# Patient Record
Sex: MALE | Race: BLACK OR AFRICAN AMERICAN | NOT HISPANIC OR LATINO | Employment: OTHER | URBAN - METROPOLITAN AREA
[De-identification: names, ages, dates, MRNs, and addresses within clinical notes are randomized per-mention and may not be internally consistent; named-entity substitution may affect disease eponyms.]

---

## 2019-07-23 ENCOUNTER — OFFICE VISIT (OUTPATIENT)
Dept: ORTHOPEDICS | Facility: CLINIC | Age: 69
End: 2019-07-23
Payer: MEDICARE

## 2019-07-23 ENCOUNTER — HOSPITAL ENCOUNTER (OUTPATIENT)
Dept: RADIOLOGY | Facility: HOSPITAL | Age: 69
Discharge: HOME | End: 2019-07-23
Attending: PHYSICIAN ASSISTANT
Payer: MEDICARE

## 2019-07-23 VITALS — WEIGHT: 175.5 LBS | HEIGHT: 72 IN | BODY MASS INDEX: 23.77 KG/M2

## 2019-07-23 DIAGNOSIS — M25.562 LEFT KNEE PAIN, UNSPECIFIED CHRONICITY: ICD-10-CM

## 2019-07-23 DIAGNOSIS — M17.0 PRIMARY OSTEOARTHRITIS OF BOTH KNEES: Primary | ICD-10-CM

## 2019-07-23 PROCEDURE — 73564 X-RAY EXAM KNEE 4 OR MORE: CPT | Mod: 26,LT,, | Performed by: RADIOLOGY

## 2019-07-23 PROCEDURE — 99999 PR PBB SHADOW E&M-EST. PATIENT-LVL III: CPT | Mod: PBBFAC,,, | Performed by: PHYSICIAN ASSISTANT

## 2019-07-23 PROCEDURE — 73564 XR KNEE ORTHO LEFT WITH FLEXION: ICD-10-PCS | Mod: 26,LT,, | Performed by: RADIOLOGY

## 2019-07-23 PROCEDURE — 99213 OFFICE O/P EST LOW 20 MIN: CPT | Mod: PBBFAC,25 | Performed by: PHYSICIAN ASSISTANT

## 2019-07-23 PROCEDURE — 20610 PR DRAIN/INJECT LARGE JOINT/BURSA: ICD-10-PCS | Mod: S$PBB,LT,, | Performed by: PHYSICIAN ASSISTANT

## 2019-07-23 PROCEDURE — 99999 PR PBB SHADOW E&M-EST. PATIENT-LVL III: ICD-10-PCS | Mod: PBBFAC,,, | Performed by: PHYSICIAN ASSISTANT

## 2019-07-23 PROCEDURE — 20610 DRAIN/INJ JOINT/BURSA W/O US: CPT | Mod: S$PBB,LT,, | Performed by: PHYSICIAN ASSISTANT

## 2019-07-23 PROCEDURE — 73562 XR KNEE ORTHO LEFT WITH FLEXION: ICD-10-PCS | Mod: 26,59,RT, | Performed by: RADIOLOGY

## 2019-07-23 PROCEDURE — 99203 PR OFFICE/OUTPT VISIT, NEW, LEVL III, 30-44 MIN: ICD-10-PCS | Mod: 25,S$PBB,, | Performed by: PHYSICIAN ASSISTANT

## 2019-07-23 PROCEDURE — 20610 DRAIN/INJ JOINT/BURSA W/O US: CPT | Mod: PBBFAC | Performed by: PHYSICIAN ASSISTANT

## 2019-07-23 PROCEDURE — 99203 OFFICE O/P NEW LOW 30 MIN: CPT | Mod: 25,S$PBB,, | Performed by: PHYSICIAN ASSISTANT

## 2019-07-23 PROCEDURE — 73562 X-RAY EXAM OF KNEE 3: CPT | Mod: TC,LT

## 2019-07-23 PROCEDURE — 73562 X-RAY EXAM OF KNEE 3: CPT | Mod: 26,59,RT, | Performed by: RADIOLOGY

## 2019-07-23 RX ORDER — TRIAMCINOLONE ACETONIDE 40 MG/ML
60 INJECTION, SUSPENSION INTRA-ARTICULAR; INTRAMUSCULAR
Status: COMPLETED | OUTPATIENT
Start: 2019-07-23 | End: 2019-07-23

## 2019-07-23 RX ADMIN — TRIAMCINOLONE ACETONIDE 60 MG: 40 INJECTION, SUSPENSION INTRA-ARTICULAR; INTRAMUSCULAR at 10:07

## 2019-07-23 NOTE — PROGRESS NOTES
SUBJECTIVE:     Chief Complaint : left knee pain    History of Present Illness:  Logan Massey is a 68 y.o. male seen in clinic today with a chief complaint of left knee pain. Pain started last week when he stood for several hours at a . He did not fall or twist knee. He developed a large effusion later that day and has had pain and difficulty ambulating since that time. Pt has chronic bilateral knee pain, historically right worse than left. He is using a cane, herbal supplements and topical creams. He played football in high school but denies any big injury or any history of knee surgery. No history of gout. He kneels for prayer and also does stephon chi regularly.     Past Medical History:   Diagnosis Date    Prostate cancer        Review of Systems:  Constitutional: no fever or chills  ENT: no nasal congestion or sore throat  Respiratory: no cough or shortness of breath  Cardiovascular: no chest pain or palpitations  Gastrointestinal: no nausea or vomiting, tolerating diet  Genitourinary: no hematuria or dysuria  Integument/Breast: no rash or pruritis  Hematologic/Lymphatic: no easy bruising or lymphadenopathy  Musculoskeletal: see HPI  Neurological: no seizures or tremors  Behavioral/Psych: no auditory or visual hallucinations    OBJECTIVE:     PHYSICAL EXAM:  Height 6' (1.829 m), weight 79.6 kg (175 lb 7.8 oz).   General Appearance: WDWN, NAD  Gait: antalgic, using cane   Neuro/Psych: Mood & affect appropriate  Lungs: Respirations equal and unlabored.   CV: 2+ bilateral upper and lower extremity pulses.   Skin: Intact throughout LE  Extremities: No LE edema    Right Knee Exam  Range of Motion:0-120 active   Effusion:none  Condition of skin:intact  Location of tenderness:Medial joint line   Strength:5 of 5 quadriceps strength and 5 of 5 hamstring strength  Stability:stable to testing    Left Knee Exam  Range of Motion:5-115 active   Effusion:yes  Condition of skin:intact  Location of tenderness:Medial  joint line and Lateral joint line   Strength:5 of 5 quadriceps strength and 5 of 5 hamstring strength  Stability:stable to testing    Alignment: Moderate varus    Left Hip Examination: no pain with PROM     RADIOGRAPHS: AP, lateral and merchant knee x-rays ordered and images reviewed today by me reveal advanced degenerative changes in both knees.     ASSESSMENT/PLAN:   Primary osteoarthritis of both knees  - Left knee effusion  - Arthrocentesis and injection of left knee today  - Discussed activity modification, low impact activity  - Discussed TKA.   - Pt will call for f/u when ready. Will schedule with surgeon when ready to discuss TKA    Knee Arthrocentesis With Injection Procedure Note  Diagnosis: left knee osteoarthritis with effusion  Indications: Knee pain  Procedure Details: Verbal consent obtained and allergies reviewed. Area prepped with alcohol.  An 18 gauge needle was inserted into superolateral aspect of knee. 210 ml of clear yellow was removed from the joint and discarded.  The joint was then injected through the same needle with 1.5 mg of Kenalog and 3 ml 1% lidocaine. The needle was removed and the area was cleansed and dressed.    Complications: None.

## 2019-08-01 ENCOUNTER — TELEPHONE (OUTPATIENT)
Dept: ORTHOPEDICS | Facility: CLINIC | Age: 69
End: 2019-08-01

## 2019-08-01 NOTE — TELEPHONE ENCOUNTER
----- Message from Maya  sent at 8/1/2019 10:58 AM CDT -----  Contact: Self  Pt requesting call back to discuss surgery for a total knee replacement @ [phone]             We spoke  He chose to see Dr [facility] to discuss a total knee replacement

## 2019-08-06 ENCOUNTER — OFFICE VISIT (OUTPATIENT)
Dept: ORTHOPEDICS | Facility: CLINIC | Age: 69
End: 2019-08-06
Payer: MEDICARE

## 2019-08-06 ENCOUNTER — HOSPITAL ENCOUNTER (OUTPATIENT)
Dept: RADIOLOGY | Facility: HOSPITAL | Age: 69
Discharge: HOME | End: 2019-08-06
Attending: ORTHOPAEDIC SURGERY
Payer: MEDICARE

## 2019-08-06 VITALS
HEART RATE: 89 BPM | SYSTOLIC BLOOD PRESSURE: 157 MMHG | DIASTOLIC BLOOD PRESSURE: 97 MMHG | BODY MASS INDEX: 23.74 KG/M2 | HEIGHT: 72 IN | WEIGHT: 175.25 LBS

## 2019-08-06 DIAGNOSIS — M17.9 OSTEOARTHRITIS OF KNEE, UNSPECIFIED (CODE): Primary | ICD-10-CM

## 2019-08-06 DIAGNOSIS — M17.9 OSTEOARTHRITIS OF KNEE, UNSPECIFIED (CODE): ICD-10-CM

## 2019-08-06 PROCEDURE — 73560 X-RAY EXAM OF KNEE 1 OR 2: CPT | Mod: 26,LT,, | Performed by: RADIOLOGY

## 2019-08-06 PROCEDURE — 99214 PR OFFICE/OUTPT VISIT, EST, LEVL IV, 30-39 MIN: ICD-10-PCS | Mod: S$PBB,,, | Performed by: ORTHOPAEDIC SURGERY

## 2019-08-06 PROCEDURE — 99214 OFFICE O/P EST MOD 30 MIN: CPT | Mod: PBBFAC,25 | Performed by: ORTHOPAEDIC SURGERY

## 2019-08-06 PROCEDURE — 99214 OFFICE O/P EST MOD 30 MIN: CPT | Mod: S$PBB,,, | Performed by: ORTHOPAEDIC SURGERY

## 2019-08-06 PROCEDURE — 73560 XR KNEE 1 OR 2 VIEW LEFT: ICD-10-PCS | Mod: 26,LT,, | Performed by: RADIOLOGY

## 2019-08-06 PROCEDURE — 73560 X-RAY EXAM OF KNEE 1 OR 2: CPT | Mod: TC,LT

## 2019-08-06 PROCEDURE — 99999 PR PBB SHADOW E&M-EST. PATIENT-LVL IV: CPT | Mod: PBBFAC,,, | Performed by: ORTHOPAEDIC SURGERY

## 2019-08-06 PROCEDURE — 99999 PR PBB SHADOW E&M-EST. PATIENT-LVL IV: ICD-10-PCS | Mod: PBBFAC,,, | Performed by: ORTHOPAEDIC SURGERY

## 2019-08-06 NOTE — LETTER
August 6, 2019      Shae Moffett PA-C  [address]  [facility] [address]           [facility]oped[facility]  [address]  [facility] [address]  Phone: [phone]          Patient: Logan Massey   MR Number: [id number]   YOB: 1950   Date of Visit: 8/6/2019       Dear Shae Moffett:    Thank you for referring Logan Massey to me for evaluation. Attached you will find relevant portions of my assessment and plan of care.    If you have questions, please do not hesitate to call me. I look forward to following Logan Massey along with you.    Sincerely,    [facility] MD    Enclosure  CC:  No Recipients    If you would like to receive this communication electronically, please contact [email or web address] or [phone] to request more information on [employer] Link access.    For providers and/or their staff who would like to refer a patient to [facility], please contact us through our one-stop-shop provider referral line, [facility], at [phone].    If you feel you have received this communication in error or would no longer like to receive these types of communications, please e-mail [email or web address]

## 2019-08-06 NOTE — PROGRESS NOTES
HPI:    Logan Massey is a 69 y.o. male who is here today for   Chief Complaint   Patient presents with    Left Knee - Pain      Pt has chronic bilateral knee pain, historically right worse than left. However 2 weeks ago he was at a  and was standing for two hours. After this he had severe knee pain and large effusion. He was seen by Shae Moffett in clinic a week ago. 210 cc were aspirated and steroid injected at that time. He reports continued knee pain. H/o prostate CA treated with watchful waiting. He is using a cane, herbal supplements and topical creams. He played football in high school but denies any big injury or any history of knee surgery. No history of gout. He kneels for prayer and also does stephon chi regularly    Duration: > 5 year history of knee pain however more severe over last month  Intensity: moderate  Character of pain: dull  Location: He reports that the pain is predominately  medial  Patient's pain increases with activity.  Pain is increased with weightbearing and interferes with activities of daily living.    He has tried conservative management including NSAIDS, injections, and activity modification without relief.    He has discussed options with his family and wishes to schedule TKA.     PMSFSH reviewed per clinic record       Past Medical History:   Diagnosis Date    Prostate cancer         No current outpatient medications on file.     Review of patient's allergies indicates:  No Known Allergies     ROS  Constitutional: Negative for fever, Negative for weight loss  HENT Negative for congestion  Cardiovascular: Negative chest pain  Respiratory: Negative Shortness of breath  Heme: Negative excessive bleeding  Skin:NegativeItching, Negative breakdown  Musculoskeletal:Negative for back pain, Positive for joint pain, Negative muscle pain, Negative muscle weakness  Neurological: Negative for numbness and paresthesias   Psychiatric/Behavioral: Negative altered mental status, Negative  for depression    Physical Exam:   BP (!) 157/97 (BP Location: Right arm, Patient Position: Sitting, BP Method: Medium (Automatic))   Pulse 89   Ht 6' (1.829 m)   Wt 79.5 kg (175 lb 4.3 oz)   BMI 23.77 kg/m²   General appearance: This is a well-developed, Well nourished male No obvious acute distress.  Psychiatric: normal mood and affect;  pleasant and conversant; judgment and thought content normal  Gait is coordinated. Patient has antalgic gait to the left  Cardiovascular: There are no swelling or varicosities present.   Respiratory: normal respiratory effort   Lymphatic: no adenopathy   Neurologic: alert and oriented to person, place, and time   Deep Tendon Reflexes are normal;  Coordination and Balance: Normal   Musculoskeletal   Neck    ROM shows normal flexion and extension and lateral rotation    Palpation: Non-tender    Stability is normal    Strength is normal    Skin is normal without masses and lesions    Sensation is intact to light touch   Back    ROM showsnormal flexion, extension and     rotation    Palpation shows no masses    Stability is normal    Strength to flexion and extension well maintained    Core strength is diminished    Skin shows no rashes or cafe au lait spots;     Sensation is intact to light touch  Right hip   Range of motion normal    Left Hip  Range of motionnormal    Right Knee  Swelling none  TendernessNone  Range of Motion: 0-110  Motion is painfulNo  Crepitance presentYes    Right Leg  Neurologic Intact  Pulses Intact    Left Knee: Swelling Mild  TendernessNone  Range of Motion: 1-110   Motion is painful No    Left Leg   Neurologic Intact  Pulses Intact    Radiograph: Show severe degenerative arthritis with subchondral sclerosis, periarticular osteophytes and narrowing of joint space.  Angle: significant varus    Physical therapy is contraindicated due to potential bone loss on this severe arthritic joint.    Assessment:  Knee arthritis bilateral      He will need to be  cleared in our PreOp center. Will need to get medical records from [place], [place] where he is from.        He  has a past medical history of Prostate cancer. . We will have to take this into account. He  will be followed by the hospitalist service while in the hospital.       We have gone over the hospitalization and recovery with him as well.  This is typically around 2 weeks on a walker and transition to a cane after that.  He will have home health likely for 3-4 weeks and then transition to outpatient if necessary.  He is agreement with this plan of care and is ready to proceed.  I will see him back for clinical recheck at the 2-week postop michelle.  I will see him back for clinical recheck for any other questions or problems as needed and certainly for any other issues in the interim.    We have discussed risks of total knee replacement which include but are not limited to blood clots in the legs that can travel to the lungs (pulmonary embolism). Pulmonary embolism can cause shortness of breath, chest pain, and even shock. Other risks include urinary tract infection, nausea and vomiting (usually related to pain medication), chronic knee pain and stiffness, bleeding into the knee joint, nerve damage, blood vessel injury, and infection of the knee which can require re-operation. Furthermore, the risks of anesthesia include potential heart, lung, kidney, and liver damage.

## 2019-08-12 ENCOUNTER — TELEPHONE (OUTPATIENT)
Dept: PREADMISSION TESTING | Facility: HOSPITAL | Age: 69
End: 2019-08-12

## 2019-08-12 DIAGNOSIS — Z01.818 PRE-OP TESTING: Primary | ICD-10-CM

## 2019-08-12 DIAGNOSIS — M79.606 PAIN OF LOWER EXTREMITY, UNSPECIFIED LATERALITY: ICD-10-CM

## 2019-08-12 NOTE — TELEPHONE ENCOUNTER
----- Message from Lo Lindsay LPN sent at 8/12/2019  9:51 AM CDT -----  8/26/19-Ortho (knee arthroplasty) Patient needs CBC,BMP,PT/INR,OPOC and POC appt.

## 2019-08-19 DIAGNOSIS — Z01.818 PRE-OP TESTING: Primary | ICD-10-CM

## 2019-08-20 ENCOUNTER — INITIAL CONSULT (OUTPATIENT)
Dept: INTERNAL MEDICINE | Facility: CLINIC | Age: 69
End: 2019-08-20
Payer: MEDICARE

## 2019-08-20 ENCOUNTER — HOSPITAL ENCOUNTER (OUTPATIENT)
Dept: CARDIOLOGY | Facility: CLINIC | Age: 69
Discharge: HOME | End: 2019-08-20
Payer: MEDICARE

## 2019-08-20 ENCOUNTER — OFFICE VISIT (OUTPATIENT)
Dept: ORTHOPEDICS | Facility: CLINIC | Age: 69
End: 2019-08-20
Payer: MEDICARE

## 2019-08-20 ENCOUNTER — HOSPITAL ENCOUNTER (OUTPATIENT)
Dept: PREADMISSION TESTING | Facility: HOSPITAL | Age: 69
Discharge: HOME | End: 2019-08-20
Attending: ANESTHESIOLOGY
Payer: MEDICARE

## 2019-08-20 VITALS
HEIGHT: 72 IN | WEIGHT: 175.31 LBS | BODY MASS INDEX: 23.75 KG/M2 | OXYGEN SATURATION: 99 % | HEART RATE: 70 BPM | DIASTOLIC BLOOD PRESSURE: 90 MMHG | SYSTOLIC BLOOD PRESSURE: 152 MMHG

## 2019-08-20 VITALS
DIASTOLIC BLOOD PRESSURE: 96 MMHG | WEIGHT: 175.25 LBS | BODY MASS INDEX: 23.74 KG/M2 | HEIGHT: 72 IN | SYSTOLIC BLOOD PRESSURE: 156 MMHG

## 2019-08-20 VITALS — SYSTOLIC BLOOD PRESSURE: 152 MMHG | TEMPERATURE: 97 F | DIASTOLIC BLOOD PRESSURE: 85 MMHG

## 2019-08-20 DIAGNOSIS — D55.0: ICD-10-CM

## 2019-08-20 DIAGNOSIS — M17.12 PRIMARY OSTEOARTHRITIS OF LEFT KNEE: Primary | ICD-10-CM

## 2019-08-20 DIAGNOSIS — R17 ELEVATED BILIRUBIN: ICD-10-CM

## 2019-08-20 DIAGNOSIS — E16.2 HYPOGLYCEMIA: ICD-10-CM

## 2019-08-20 DIAGNOSIS — Z01.818 PREOP EXAMINATION: Primary | ICD-10-CM

## 2019-08-20 DIAGNOSIS — R30.0 DYSURIA: ICD-10-CM

## 2019-08-20 DIAGNOSIS — Z85.46 HISTORY OF PROSTATE CANCER: ICD-10-CM

## 2019-08-20 DIAGNOSIS — D75.89 MACROCYTOSIS: ICD-10-CM

## 2019-08-20 DIAGNOSIS — R03.0 ELEVATED BP WITHOUT DIAGNOSIS OF HYPERTENSION: ICD-10-CM

## 2019-08-20 DIAGNOSIS — D72.819 LEUKOPENIA, UNSPECIFIED TYPE: ICD-10-CM

## 2019-08-20 DIAGNOSIS — Z01.818 PRE-OP TESTING: ICD-10-CM

## 2019-08-20 PROCEDURE — 99999 PR PBB SHADOW E&M-EST. PATIENT-LVL III: ICD-10-PCS | Mod: PBBFAC,,, | Performed by: PHYSICIAN ASSISTANT

## 2019-08-20 PROCEDURE — 99211 OFF/OP EST MAY X REQ PHY/QHP: CPT | Mod: PBBFAC,25 | Performed by: HOSPITALIST

## 2019-08-20 PROCEDURE — 93010 EKG 12-LEAD: ICD-10-PCS | Mod: S$PBB,,, | Performed by: INTERNAL MEDICINE

## 2019-08-20 PROCEDURE — 99499 UNLISTED E&M SERVICE: CPT | Mod: S$PBB,,, | Performed by: PHYSICIAN ASSISTANT

## 2019-08-20 PROCEDURE — 99999 PR PBB SHADOW E&M-EST. PATIENT-LVL I: ICD-10-PCS | Mod: PBBFAC,,, | Performed by: HOSPITALIST

## 2019-08-20 PROCEDURE — 99204 PR OFFICE/OUTPT VISIT, NEW, LEVL IV, 45-59 MIN: ICD-10-PCS | Mod: S$PBB,,, | Performed by: HOSPITALIST

## 2019-08-20 PROCEDURE — 99204 OFFICE O/P NEW MOD 45 MIN: CPT | Mod: S$PBB,,, | Performed by: HOSPITALIST

## 2019-08-20 PROCEDURE — 99999 PR PBB SHADOW E&M-EST. PATIENT-LVL I: CPT | Mod: PBBFAC,,, | Performed by: HOSPITALIST

## 2019-08-20 PROCEDURE — 93010 ELECTROCARDIOGRAM REPORT: CPT | Mod: S$PBB,,, | Performed by: INTERNAL MEDICINE

## 2019-08-20 PROCEDURE — 99213 OFFICE O/P EST LOW 20 MIN: CPT | Mod: PBBFAC,25,27 | Performed by: PHYSICIAN ASSISTANT

## 2019-08-20 PROCEDURE — 99999 PR PBB SHADOW E&M-EST. PATIENT-LVL III: CPT | Mod: PBBFAC,,, | Performed by: PHYSICIAN ASSISTANT

## 2019-08-20 PROCEDURE — 93005 ELECTROCARDIOGRAM TRACING: CPT | Mod: PBBFAC | Performed by: INTERNAL MEDICINE

## 2019-08-20 PROCEDURE — 99499 NO LOS: ICD-10-PCS | Mod: S$PBB,,, | Performed by: PHYSICIAN ASSISTANT

## 2019-08-20 RX ORDER — DOCUSATE SODIUM 100 MG/1
100 CAPSULE, LIQUID FILLED ORAL DAILY
Status: ON HOLD | COMMUNITY
End: 2019-10-26 | Stop reason: HOSPADM

## 2019-08-20 RX ORDER — ASCORBIC ACID 125 MG
1000 TABLET,CHEWABLE ORAL 2 TIMES DAILY
COMMUNITY

## 2019-08-20 RX ORDER — LANOLIN ALCOHOL/MO/W.PET/CERES
1000 CREAM (GRAM) TOPICAL 2 TIMES DAILY
COMMUNITY

## 2019-08-20 NOTE — PROGRESS NOTES
[facility] - Pre Op Consult  Progress Note    Patient Name: Logan Massey  MRN: [id number]  Date of Evaluation- 2019  PCP- Silvestre Celeste, DO    Future cases for Logan Massey [[id number]]     Case ID Status Date Time Musa Procedure Provider Location    [id number] [facility] 2019 10:00  ARTHROPLASTY, KNEE, TOTAL [facility] [[id number]] [facility] OR 2ND FLR      Left     HPI:  History of present illness- I had the pleasure of meeting this pleasant 69 y.o. gentleman in the pre op clinic prior to his elective Orthopedic surgery. The patient is new to me . Logan was accompanied by wife Fabiano.    I have obtained the history by speaking to the patient and by reviewing the electronic health records.    Events leading up to surgery / History of presenting illness -    He has been troubled with  mild- moderate Left knee  pain for years ( 5 years ) .  Was at a  and had to stand for 2 hours at which pain increase a lot , left knee swelling . Since then , swelling is down, pain better  As per wife , has high tolerance   Left knee feels like giving out a few times- No falls      Pain increases with activity and decreases with resting, ice pack    Relevant health conditions of significance for the perioperative period/ History of presenting illness -    Subjectively describes health as good     Not known to have heart disease , Diabetes Mellitus, Lung disease , HTN    [place] post op   Supportive neighbors      Subjective/ Objective:          Chief complaint-Preoperative evaluation, Perioperative Medical management, complication reduction plan     Active cardiac conditions- none    Revised cardiac risk index predictors- none    Functional capacity -Examples of physical activity , was active until 2019 , was walking for exercising , [employer] , 4 times a week, house work and can take 1 flight of stairs----- He can undertake all the  above activities without  chest pain,chest tightness, Shortness of breath ,dizziness,lightheadedness making his exercise tolerance more,   than 4 Mets.       Review of Systems   Constitutional: Negative for chills and fever.        No unusual weight changes     HENT:        YOUNGG score 3 / 8    HTN?    Age over 50 years  Neck size over 40 CM  Male gender   Eyes:        No unusual vision changes   Respiratory:        No cough , phlegm    No Hemoptysis   Cardiovascular:        As noted   Gastrointestinal:        Bowels- Regular -  No overt GI/ blood losses    Last colonoscopy was 2018 - as per him- up to date  Suggested follow up    Endocrine:        Prednisone use > 20 mg daily for 3 weeks   Genitourinary: Positive for dysuria (mild).        No urinary hesitancy    Musculoskeletal:        As above      Skin: Negative for rash.   Neurological: Negative for syncope.        No unilateral weakness   Hematological:        Current use of Anticoagulants  None     Multiple supplements use- suggested care    Psychiatric/Behavioral:        Not diagnosed  Depression,Anxiety    No SI/HI   No vascular stenting       No past medical history pertinent negatives.        No anesthesia, bleeding, cardiac problems , PONVwith previous surgeries/procedures.  Medications and Allergies reviewed in epic.     FH- No anesthesia,bleeding /  Thrombotic tendency , early onset heart disease in family     Physical Exam     /90   Pulse 70   Sat 99 %      Physical Exam  Constitutional-   General appearance-Conscious,Coherent  Eyes- No conjunctival icterus,pupils  round   ENT-Oral cavity-suggested follow up regarding a ? scar on the Rt upper palate ,  moist  and  upper partial denture  ,No bleeding . Seeing dentist this week  Hearing grossly normal   Neck- No thyromegaly ,Trachea -central, No jugular venous distension,   No Carotid Bruit   Cardiovascular -Heart Sounds- Normal  and  no murmur   , No gallop rhythm   Respiratory - Normal  Respiratory Effort, Normal breath sounds,  no wheeze  and  no forced expiratory wheeze    Peripheral pitting pedal edema-- none , no calf pain   Gastrointestinal -Soft abdomen, No palpable masses, Non Tender,Liver,Spleen not palpable. No-- free fluid and shifting dullness  Musculoskeletal- No finger Clubbing. Strength grossly normal   Lymphatic-No Palpable cervical, axillary,Inguinal lymphadenopathy   Psychiatric - normal effect,Orientation  Rt Dorsalis pedis pulses-palpable    Lt Dorsalis pedis pulses- palpable   Rt Posterior tibial pulses -palpable   Left posterior tibial pulses -palpable   Miscellaneous -  no asterixis,  no dupuytren's contracture and  no renal bruit    Investigations  Lab and Imaging have been reviewed in [facility].    Review of Medicine tests    EKG- I had independently reviewed the EKG from--8/20/2019   It was reported to be showing     Normal sinus rhythm    When compared with ECG of 25-JUL-2014 16:11,  No significant change was found    Tele 8/7/2014 - SR    Review of clinical lab tests:  Lab Results   Component Value Date    CREATININE 1.0 07/25/2014    HGB 15.2 08/20/2019     08/20/2019         Review of old records- Was done and information gathered regards to events leading to surgery and health conditions of significance in the perioperative period.        Preoperative cardiac risk assessment-  The patient does not have any active cardiac conditions . Revised cardiac risk index predictors-0 ---.Functional capacity is more than 4 Mets. He will be undergoing a Orthopedic procedure that carries a intermediate risk     Risk of a major Cardiac event ( Defined as death, myocardial infarction, or cardiac arrest at 30 days after noncardiac surgery), based on RCRI score     3.9%        No further cardiac work up is indicated prior to proceeding with the surgery     Orders Placed This Encounter    Urinalysis       American Society of Anesthesiologists Physical status classification ( ASA )  class: 3     Postoperative pulmonary complication risk assessment:      ARISCAT ( Canet) risk index- risk class -  Low, if duration of surgery is under 3 hours, intermediate, if duration of surgery is over 3 hours        Assessment/Plan:     History of prostate cancer  Diagnosed - around 2012   Felt to be slow growing-   Gets PSA checks- last was 16   Suggested follow up   No urinary hesitancy  Wakes up 1-2 times at night time     Increased risk of post operative urinary retention  Benadryl avoidance discussed - from this stand point           Elevated BP without diagnosis of hypertension  Readings at [facility] Aug 2019 - 150/-90  No headaches, no headaches    Home BP readings- 140-120/80's    Suggested checking the accuracy of  home BP machine with that of the PCP    Suggested follow up     suggested checking BP and corresponding to me     Dysuria  Check UA    Leukopenia  No proneness to infection   Suggested follow up  Taking multiple supplements  I suggest that the perioperative team be aware of this so that appropriate  care can be taken      Macrocytosis  No alcohol excess   No liver disease   Suggested follow up   On B12     Glucose-6-phosphate dehydrogenase (G-6-PD) deficiency anemia  Known to have G6 PD deficiency  Recently diagnosed     I suggest that the perioperative team be aware of this so that appropriate  care can be taken     No previous problem with surgery in the past , in this regards     Hypoglycemia  Lab from 8/20/2019  showed hypoglycemia     Spoke to him about hypoglycemia   Asymptomatic   No hypoglycemia in the past   No bariatric surgery   Suggested to have meals on regular intervals, follow up suggested     I suggest Ary op glucose monitoring           Elevated bilirubin  Direct bilirubin mildly elevated at 0.4 ( Reference range 0.1-0.3 )   Alkaline phosphatase normal   For now, I suggest follow up   He has a history of G6 PD deficiency  Hemolysis , how ever may not explain the direct  bilirubin elevation          Preventive perioperative care    Thromboembolic prophylaxis:  His risk factors for thrombosis include surgical procedure and age.I suggest  thromboembolic prophylaxis ( mechanical/pharmacological, weighing the risk benefits of pharmacological agent use considering jodi procedural bleeding )  during the perioperative period.I suggested being active in the post operative period. The patient is a candidate for extended DVT prophylaxis     Postoperative pulmonary complication prophylaxis-Risk factors for post operative pulmonary complications include age over 65 years and ASA class >2- I suggest incentive spirometry use, early ambulation and end tidal carbon dioxide monitoring , oral care , head end of bed elevation       Renal complication prophylaxis- I suggest keeping him well hydrated  in the perioperative period. Stays hydrated      Surgical site Infection Prophylaxis-I  suggest appropriate antibiotic for Prophylaxis against Surgical site infections       In view of LUTS , regional anesthesia  the patient  is at risk of postoperative urinary retention.  I suggest avoidance / minimizing the of  Benzodiazepines,Anticholinergic medication,antihistamines ( Benadryl) , if possible in the perioperative period. I suggest using the minimum possible use of opioids for the minimum period of time in the perioperative period. Benadryl avoidance suggested      This visit was focused on Preoperative evaluation, Perioperative Medical management, complication reduction plans. I suggest that the patient follows up with primary care or relevant sub specialists for ongoing health care.    I appreciate the opportunity to be involved in this patients care. Please feel free to contact me if there were any questions about this consultation.    Patient is optimized     Patient was instructed to call and update me about any changes to health,  medication, office visits ,testing out side of the jodi operative  care center , hospitalizations between now and surgery     Kailee Cornejo MD  Perioperative Medicine  [facility]   Pager [phone]  -   To let dentist know about the surgery   Working on partial   denture   -  8/20- 18 42    BMP from 8/20/ 2019 showed hypoglycemia   BP (!) 152/85 Comment: lt and 149/88 rt  Temp 97.2 °F (36.2 °C) (Oral)    Spoke to him about hypoglycemia  UA to be done    Hepatic function panel to be added   -  8/22- 7 59     Urinalysis from 8/21/2019 showed - no UTI - it was done as he had some dysuria- no strong suspicion of UTI   Direct bilirubin mildly elevated at 0.4 ( Reference range 0.1-0.3 )   Alkaline phosphatase normal   For now, I suggest follow up   He has a history of G6 PD deficiency  Hemolysis , how ever may not explain the direct bilirubin elevation    No suggestion of liver decompensation   Requested office to follow up on BP readings  -  8/23- 19 47   Chart reviewed -    He  doesn't feel he is ready for  Surgery   Met for pre op evaluation   Surgery did not take place as planned   Suggested follow up with primary care , about BP , elevated Bilirubin     Called and spoke to him   Will ask office to mail CMP LFT to him , for follow up with PCP  Follow up regarding G6 PD  No UTI on UA discussed  Call, if needed and once rescheduled

## 2019-08-20 NOTE — OUTPATIENT SUBJECTIVE & OBJECTIVE
Outpatient Subjective & Objective     Chief complaint-Preoperative evaluation, Perioperative Medical management, complication reduction plan     Active cardiac conditions- none    Revised cardiac risk index predictors- none    Functional capacity -Examples of physical activity , was active until July 2019 , was walking for exercising , [employer] [employer] , 4 times a week, house work and can take 1 flight of stairs----- He can undertake all the above activities without  chest pain,chest tightness, Shortness of breath ,dizziness,lightheadedness making his exercise tolerance more,   than 4 Mets.       Review of Systems   Constitutional: Negative for chills and fever.        No unusual weight changes     HENT:        STOPBANG score 3 / 8    HTN?    Age over 50 years  Neck size over 40 CM  Male gender   Eyes:        No unusual vision changes   Respiratory:        No cough , phlegm    No Hemoptysis   Cardiovascular:        As noted   Gastrointestinal:        Bowels- Regular -  No overt GI/ blood losses    Last colonoscopy was 2018 - as per him- up to date  Suggested follow up    Endocrine:        Prednisone use > 20 mg daily for 3 weeks   Genitourinary: Positive for dysuria (mild).        No urinary hesitancy    Musculoskeletal:        As above      Skin: Negative for rash.   Neurological: Negative for syncope.        No unilateral weakness   Hematological:        Current use of Anticoagulants  None     Multiple supplements use- suggested care    Psychiatric/Behavioral:        Not diagnosed  Depression,Anxiety    No SI/HI   No vascular stenting       No past medical history pertinent negatives.        No anesthesia, bleeding, cardiac problems , PONVwith previous surgeries/procedures.  Medications and Allergies reviewed in epic.     FH- No anesthesia,bleeding /  Thrombotic tendency , early onset heart disease in family     Physical Exam     /90   Pulse 70   Sat 99 %      Physical Exam  Constitutional-   General  appearance-Conscious,Coherent  Eyes- No conjunctival icterus,pupils  round   ENT-Oral cavity-suggested follow up regarding a ? scar on the Rt upper palate ,  moist  and  upper partial denture  ,No bleeding . Seeing dentist this week  Hearing grossly normal   Neck- No thyromegaly ,Trachea -central, No jugular venous distension,   No Carotid Bruit   Cardiovascular -Heart Sounds- Normal  and  no murmur   , No gallop rhythm   Respiratory - Normal Respiratory Effort, Normal breath sounds,  no wheeze  and  no forced expiratory wheeze    Peripheral pitting pedal edema-- none , no calf pain   Gastrointestinal -Soft abdomen, No palpable masses, Non Tender,Liver,Spleen not palpable. No-- free fluid and shifting dullness  Musculoskeletal- No finger Clubbing. Strength grossly normal   Lymphatic-No Palpable cervical, axillary,Inguinal lymphadenopathy   Psychiatric - normal effect,Orientation  Rt Dorsalis pedis pulses-palpable    Lt Dorsalis pedis pulses- palpable   Rt Posterior tibial pulses -palpable   Left posterior tibial pulses -palpable   Miscellaneous -  no asterixis,  no dupuytren's contracture and  no renal bruit    Investigations  Lab and Imaging have been reviewed in [facility].    Review of Medicine tests    EKG- I had independently reviewed the EKG from--8/20/2019   It was reported to be showing     Normal sinus rhythm    When compared with ECG of 25-JUL-2014 16:11,  No significant change was found    Tele 8/7/2014 - SR    Review of clinical lab tests:  Lab Results   Component Value Date    CREATININE 1.0 07/25/2014    HGB 15.2 08/20/2019     08/20/2019         Review of old records- Was done and information gathered regards to events leading to surgery and health conditions of significance in the perioperative period.    Outpatient Subjective & Objective

## 2019-08-20 NOTE — ASSESSMENT & PLAN NOTE
Lab from 8/20/2019  showed hypoglycemia     Spoke to him about hypoglycemia   Asymptomatic   No hypoglycemia in the past   No bariatric surgery   Suggested to have meals on regular intervals, follow up suggested     I suggest Ary op glucose monitoring

## 2019-08-20 NOTE — ASSESSMENT & PLAN NOTE
Diagnosed - around 2012   Felt to be slow growing-   Gets PSA checks- last was 16   Suggested follow up   No urinary hesitancy  Wakes up 1-2 times at night time     Increased risk of post operative urinary retention  Benadryl avoidance discussed - from this stand point

## 2019-08-20 NOTE — ASSESSMENT & PLAN NOTE
No proneness to infection   Suggested follow up  Taking multiple supplements  I suggest that the perioperative team be aware of this so that appropriate  care can be taken

## 2019-08-20 NOTE — ASSESSMENT & PLAN NOTE
Known to have G6 PD deficiency  Recently diagnosed     I suggest that the perioperative team be aware of this so that appropriate  care can be taken     No previous problem with surgery in the past , in this regards

## 2019-08-20 NOTE — LETTER
August 20, 2019      [facility] MD  [address] [facility] [address]           [facility]  [address] [address]  Phone: [phone]          Patient: Logan Massey   MR Number: [id number]   YOB: 1950   Date of Visit: 8/20/2019       Dear Dr. [facility].:    Thank you for referring Logan Massey to me for evaluation. Attached you will find relevant portions of my assessment and plan of care.    If you have questions, please do not hesitate to call me. I look forward to following Logan Massey along with you.    Sincerely,    Kailee Cornejo MD    Enclosure  CC:  Silvestre Celeste, DO    If you would like to receive this communication electronically, please contact [email or web address] or [phone] to request more information on [employer] Link access.    For providers and/or their staff who would like to refer a patient to [facility], please contact us through our one-stop-shop provider referral line, [facility], at [phone].    If you feel you have received this communication in error or would no longer like to receive these types of communications, please e-mail [email or web address]

## 2019-08-20 NOTE — PROGRESS NOTES
Logan Massey is a 69 y.o. year old here today for a pre-operative visit in preparation for a Left total knee arthroplasty to be performed by  Dr. [facility] on 8/26/2019.  he was last seen and treated in the clinic on 8/6/2019. he will be medically optimized by the pre op center. There has been no significant change in medical status since last visit. No fever, chills, malaise, or unexplained weight change.      Allergies, Medications, past medical and surgical history reviewed.    Focused examination performed.    Dr. [facility] saw this patient today in clinic. All questions answered. Patient encouraged to call with questions. Contact information given.     Pre, jodi, and post operative procedures and expectations discussed. Questions were answered. Logan Massey has been educated and is ready to proceed with surgery. Approximately 30 minutes was spent discussing surgical outcomes, plans, procedures pre, jodi, and post operative expections and care.  Surgical consent signed.    Logan Bryson will contact us if there are any questions, concerns, or changes in medical status prior to surgery.

## 2019-08-20 NOTE — ASSESSMENT & PLAN NOTE
Readings at [facility] Aug 2019 - 150/-90  No headaches, no headaches    Home BP readings- 140-120/80's    Suggested checking the accuracy of  home BP machine with that of the PCP    Suggested follow up     suggested checking BP and corresponding to me    WDL

## 2019-08-20 NOTE — HPI
History of present illness- I had the pleasure of meeting this pleasant 69 y.o. gentleman in the pre op clinic prior to his elective Orthopedic surgery. The patient is new to me . Logan was accompanied by wife Fabiano.    I have obtained the history by speaking to the patient and by reviewing the electronic health records.    Events leading up to surgery / History of presenting illness -    He has been troubled with  mild- moderate Left knee  pain for years ( 5 years ) .  Was at a  and had to stand for 2 hours at which pain increase a lot , left knee swelling . Since then , swelling is down, pain better  As per wife , has high tolerance   Left knee feels like giving out a few times- No falls      Pain increases with activity and decreases with resting, ice pack    Relevant health conditions of significance for the perioperative period/ History of presenting illness -    Subjectively describes health as good     Not known to have heart disease , Diabetes Mellitus, Lung disease , HTN    [place] post [place]   Supportive neighbors

## 2019-08-21 ENCOUNTER — LAB VISIT (OUTPATIENT)
Dept: LAB | Facility: HOSPITAL | Age: 69
End: 2019-08-21
Attending: HOSPITALIST
Payer: MEDICARE

## 2019-08-21 DIAGNOSIS — R30.0 DYSURIA: ICD-10-CM

## 2019-08-21 LAB
BILIRUB UR QL STRIP: NEGATIVE
CLARITY UR REFRACT.AUTO: CLEAR
COLOR UR AUTO: NORMAL
GLUCOSE UR QL STRIP: NEGATIVE
HGB UR QL STRIP: NEGATIVE
KETONES UR QL STRIP: NEGATIVE
LEUKOCYTE ESTERASE UR QL STRIP: NEGATIVE
NITRITE UR QL STRIP: NEGATIVE
PH UR STRIP: 7 [PH] (ref 5–8)
PROT UR QL STRIP: NEGATIVE
SP GR UR STRIP: 1 (ref 1–1.03)
URN SPEC COLLECT METH UR: NORMAL

## 2019-08-21 PROCEDURE — 81003 URINALYSIS AUTO W/O SCOPE: CPT

## 2019-08-21 RX ORDER — SODIUM CHLORIDE 0.9 % (FLUSH) 0.9 %
10 SYRINGE (ML) INJECTION
Status: CANCELLED | OUTPATIENT
Start: 2019-08-21

## 2019-08-21 RX ORDER — ROPIVACAINE HYDROCHLORIDE 2 MG/ML
8 INJECTION, SOLUTION EPIDURAL; INFILTRATION; PERINEURAL CONTINUOUS
Status: CANCELLED | OUTPATIENT
Start: 2019-08-21

## 2019-08-21 RX ORDER — MORPHINE SULFATE 10 MG/ML
2 INJECTION, SOLUTION INTRAMUSCULAR; INTRAVENOUS
Status: CANCELLED | OUTPATIENT
Start: 2019-08-21

## 2019-08-21 RX ORDER — FAMOTIDINE 20 MG/1
20 TABLET, FILM COATED ORAL 2 TIMES DAILY
Status: CANCELLED | OUTPATIENT
Start: 2019-08-21

## 2019-08-21 RX ORDER — LIDOCAINE HYDROCHLORIDE 10 MG/ML
1 INJECTION, SOLUTION EPIDURAL; INFILTRATION; INTRACAUDAL; PERINEURAL
Status: CANCELLED | OUTPATIENT
Start: 2019-08-21

## 2019-08-21 RX ORDER — ACETAMINOPHEN 500 MG
1000 TABLET ORAL EVERY 6 HOURS
Status: CANCELLED | OUTPATIENT
Start: 2019-08-21 | End: 2019-08-23

## 2019-08-21 RX ORDER — OXYCODONE HYDROCHLORIDE 5 MG/1
10 TABLET ORAL
Status: CANCELLED | OUTPATIENT
Start: 2019-08-21

## 2019-08-21 RX ORDER — NALOXONE HCL 0.4 MG/ML
0.02 VIAL (ML) INJECTION
Status: CANCELLED | OUTPATIENT
Start: 2019-08-21

## 2019-08-21 RX ORDER — ASPIRIN 81 MG/1
81 TABLET ORAL 2 TIMES DAILY
Status: CANCELLED | OUTPATIENT
Start: 2019-08-21

## 2019-08-21 RX ORDER — PREGABALIN 25 MG/1
150 CAPSULE ORAL
Status: CANCELLED | OUTPATIENT
Start: 2019-08-21

## 2019-08-21 RX ORDER — AMOXICILLIN 250 MG
1 CAPSULE ORAL 2 TIMES DAILY
Status: CANCELLED | OUTPATIENT
Start: 2019-08-21

## 2019-08-21 RX ORDER — BISACODYL 10 MG
10 SUPPOSITORY, RECTAL RECTAL EVERY 12 HOURS PRN
Status: CANCELLED | OUTPATIENT
Start: 2019-08-21

## 2019-08-21 RX ORDER — CELECOXIB 100 MG/1
200 CAPSULE ORAL DAILY
Status: CANCELLED | OUTPATIENT
Start: 2019-08-21

## 2019-08-21 RX ORDER — CELECOXIB 100 MG/1
400 CAPSULE ORAL
Status: CANCELLED | OUTPATIENT
Start: 2019-08-21

## 2019-08-21 RX ORDER — PREGABALIN 25 MG/1
150 CAPSULE ORAL NIGHTLY
Status: CANCELLED | OUTPATIENT
Start: 2019-08-21

## 2019-08-21 RX ORDER — MUPIROCIN 20 MG/G
1 OINTMENT TOPICAL
Status: CANCELLED | OUTPATIENT
Start: 2019-08-21

## 2019-08-21 RX ORDER — RAMELTEON 8 MG/1
8 TABLET ORAL NIGHTLY PRN
Status: CANCELLED | OUTPATIENT
Start: 2019-08-21

## 2019-08-21 RX ORDER — OXYCODONE HYDROCHLORIDE 5 MG/1
5 TABLET ORAL
Status: CANCELLED | OUTPATIENT
Start: 2019-08-21

## 2019-08-21 RX ORDER — OXYCODONE HYDROCHLORIDE 5 MG/1
15 TABLET ORAL
Status: CANCELLED | OUTPATIENT
Start: 2019-08-21

## 2019-08-21 RX ORDER — MIDAZOLAM HYDROCHLORIDE 1 MG/ML
1 INJECTION INTRAMUSCULAR; INTRAVENOUS EVERY 5 MIN PRN
Status: CANCELLED | OUTPATIENT
Start: 2019-08-21

## 2019-08-21 RX ORDER — ACETAMINOPHEN 10 MG/ML
1000 INJECTION, SOLUTION INTRAVENOUS ONCE
Status: CANCELLED | OUTPATIENT
Start: 2019-08-21 | End: 2019-08-21

## 2019-08-21 RX ORDER — FENTANYL CITRATE 50 UG/ML
25 INJECTION, SOLUTION INTRAMUSCULAR; INTRAVENOUS EVERY 5 MIN PRN
Status: CANCELLED | OUTPATIENT
Start: 2019-08-21

## 2019-08-21 RX ORDER — SODIUM CHLORIDE 9 MG/ML
INJECTION, SOLUTION INTRAVENOUS
Status: CANCELLED | OUTPATIENT
Start: 2019-08-21

## 2019-08-21 RX ORDER — SODIUM CHLORIDE 9 MG/ML
INJECTION, SOLUTION INTRAVENOUS CONTINUOUS
Status: CANCELLED | OUTPATIENT
Start: 2019-08-21 | End: 2019-08-22

## 2019-08-21 RX ORDER — MUPIROCIN 20 MG/G
1 OINTMENT TOPICAL 2 TIMES DAILY
Status: CANCELLED | OUTPATIENT
Start: 2019-08-21 | End: 2019-08-26

## 2019-08-21 RX ORDER — ONDANSETRON 2 MG/ML
4 INJECTION INTRAMUSCULAR; INTRAVENOUS EVERY 8 HOURS PRN
Status: CANCELLED | OUTPATIENT
Start: 2019-08-21

## 2019-08-21 RX ORDER — POLYETHYLENE GLYCOL 3350 17 G/17G
17 POWDER, FOR SOLUTION ORAL DAILY
Status: CANCELLED | OUTPATIENT
Start: 2019-08-21

## 2019-08-21 NOTE — H&P
January 19, 2018      Hamilton Vang  [address] [facility] [address]        Dear Mr. Vang,    The results of your endoscopy performed on 1/17/18 have returned and indicate the following:    · Other Information:  NORMAL     Complete HIDA scan    It has been a pleasure caring for you. If you have questions or concerns regarding these test results or your plan of care, please feel free to contact us. You may either contact us by phone, MyChart or schedule a follow-up office visit to go over these results.      Sincerely,        Kan Evans MD  [facility] [place] GI Department  [address]  [place], [place] [id number] [phone]       CC: Left knee pain    Logan Massey is a 69 y.o. male with 5 year history of Left knee pain. Pain is worse with activity and weight bearing.  Patient has experienced interference of activities of daily living due to decreased range of motion and an increase in joint pain and swelling.  Patient has failed non-operative treatment including medications, corticosteroid injections, and activity modification.  Logan Massey currently ambulates using assistive device.     Relevant medical conditions of significance in perioperative period:  Prostate cancer: diagnosed 2012, no treatment   Elevated BP: no meds, elevated only in office per patient     Past Medical History:   Diagnosis Date    Arthritis     Prostate cancer        Past Surgical History:   Procedure Laterality Date    EXPLORATION-INGUINAL Right 8/7/2014    [facility]formed [facility] at [facility] 2ND FLR    HERNIA REPAIR Right     At 5 years- rt groin , Left side - 2014    REPAIR-HERNIA-LAPAROSCOPIC-INGUINAL Left 8/7/2014    Performed by Edg[facility] Jr., MD at [facility] OR 2ND FLR       Family History   Problem Relation Age of Onset    Heart disease Father     Hypertension Father     Hypertension Mother     Heart disease Brother     Hypertension Sister        Review of patient's allergies indicates:  No Known Allergies      Current Outpatient Medications:     fish oil-omega-3 fatty acids 300-1,000 mg capsule, Take by mouth once daily., Disp: , Rfl:     multivitamin (ONE DAILY MULTIVITAMIN) per tablet, Take 1 tablet by mouth once daily., Disp: , Rfl:     cholecalciferol, vitamin D3, (VITAMIN D3) 1,000 unit Chew, Take 1,000 Units by mouth 2 (two) times daily., Disp: , Rfl:     COLLAGEN MISC, 4 capsules by Misc.(Non-Drug; Combo Route) route once daily., Disp: , Rfl:     cyanocobalamin (VITAMIN B-12) 1000 MCG tablet, Take 1,000 mcg by mouth 2 (two) times daily., Disp: , Rfl:     docusate sodium (COLACE) 100 MG capsule, Take 100 mg by  mouth once daily., Disp: , Rfl:     Lactobacillus acidophilus (PROBIOTIC ORAL), Take 1 tablet by mouth once daily., Disp: , Rfl:     UNABLE TO FIND, Take 2 capsules by mouth 2 (two) times daily. medication name:Steel Libido, Disp: , Rfl:     UNABLE TO FIND, Take 2 tablets by mouth 2 (two) times daily. medication name: rieshi mushroom, Disp: , Rfl:     UNABLE TO FIND, Take 6 capsules by mouth 2 (two) times daily. medication name: proatacaid, Disp: , Rfl:     UNABLE TO FIND, medication name:pectasol-C 1 scoop three times a day in water, Disp: , Rfl:     Review of Systems:  Constitutional: no fever or chills  Eyes: no visual changes  ENT: no nasal congestion or sore throat  Respiratory: no cough or shortness of breath  Cardiovascular: no chest pain or palpitations  Gastrointestinal: no nausea or vomiting, tolerating diet  Genitourinary: no hematuria or dysuria  Integument/Breast: no rash or pruritis  Hematologic/Lymphatic: no easy bruising or lymphadenopathy  Musculoskeletal: positive for knee pain  Neurological: no seizures or tremors  Behavioral/Psych: no auditory or visual hallucinations  Endocrine: no heat or cold intolerance    PE:  BP (!) 156/96   Ht 6' (1.829 m)   Wt 79.5 kg (175 lb 4.3 oz)   BMI 23.77 kg/m²   General: Pleasant, cooperative, NAD   Gait: antalgic  HEENT: NCAT, sclera nonicteric   Lungs: Respirations clear bilaterally; equal and unlabored.   CV: S1S2; 2+ bilateral upper and lower extremity pulses.   Skin: Intact throughout with no rashes, erythema, or lesions  Extremities: No LE edema,  no erythema or warmth of the skin in either lower extremity.    Left knee exam:  Knee Range of Motion:0-110 active, pain with passive range of motion  Effusion: yes  Condition of skin:intact  Location of tenderness:Medial joint line   Strength:4 of 5 quadriceps strength and 5 of 5 hamstring strength  Stability: stable to testing    Hip Examination:normal    Radiographs: Radiographs reveal advanced  degenerative changes including subchondral cyst formation, subchondral sclerosis, osteophyte formation, joint space narrowing.     Knee Alignment:  Moderate varus    Diagnosis: osteoarthritis Left knee    Plan: Left total knee arthroplasty    Due to the serious nature of total joint infection and the high prevalence of community acquired MRSA, vancomycin will be used perioperatively.

## 2019-08-22 ENCOUNTER — TELEPHONE (OUTPATIENT)
Dept: ORTHOPEDICS | Facility: CLINIC | Age: 69
End: 2019-08-22

## 2019-08-22 PROBLEM — R17 ELEVATED BILIRUBIN: Status: ACTIVE | Noted: 2019-08-22

## 2019-08-22 NOTE — TELEPHONE ENCOUNTER
----- Message from Madeleine Raphael sent at 8/21/2019  5:28 PM CDT -----  Contact: self  Pt called to speak nurse to cancel procedure for Monday.        We spoke     He just doesn't feel he is ready yet    Surgery canceled

## 2019-08-22 NOTE — ASSESSMENT & PLAN NOTE
Direct bilirubin mildly elevated at 0.4 ( Reference range 0.1-0.3 )   Alkaline phosphatase normal   For now, I suggest follow up   He has a history of G6 PD deficiency  Hemolysis , how ever may not explain the direct bilirubin elevation

## 2019-08-26 ENCOUNTER — PATIENT MESSAGE (OUTPATIENT)
Dept: INTERNAL MEDICINE | Facility: CLINIC | Age: 69
End: 2019-08-26

## 2019-10-24 ENCOUNTER — HOSPITAL ENCOUNTER (OUTPATIENT)
Dept: RADIOLOGY | Facility: HOSPITAL | Age: 69
Discharge: HOME | DRG: 487 | End: 2019-10-24
Attending: NURSE PRACTITIONER
Payer: MEDICARE

## 2019-10-24 ENCOUNTER — OFFICE VISIT (OUTPATIENT)
Dept: ORTHOPEDICS | Facility: CLINIC | Age: 69
DRG: 487 | End: 2019-10-24
Payer: MEDICARE

## 2019-10-24 VITALS
DIASTOLIC BLOOD PRESSURE: 87 MMHG | SYSTOLIC BLOOD PRESSURE: 147 MMHG | TEMPERATURE: 98 F | RESPIRATION RATE: 18 BRPM | HEART RATE: 97 BPM

## 2019-10-24 DIAGNOSIS — M17.12 PRIMARY OSTEOARTHRITIS OF LEFT KNEE: ICD-10-CM

## 2019-10-24 DIAGNOSIS — M17.12 PRIMARY OSTEOARTHRITIS OF LEFT KNEE: Primary | ICD-10-CM

## 2019-10-24 DIAGNOSIS — M25.462 EFFUSION OF BURSA OF LEFT KNEE: ICD-10-CM

## 2019-10-24 PROCEDURE — 99214 OFFICE O/P EST MOD 30 MIN: CPT | Mod: 25,S$PBB,, | Performed by: NURSE PRACTITIONER

## 2019-10-24 PROCEDURE — 87070 CULTURE OTHR SPECIMN AEROBIC: CPT | Mod: 59

## 2019-10-24 PROCEDURE — 99214 OFFICE O/P EST MOD 30 MIN: CPT | Mod: PBBFAC,25 | Performed by: NURSE PRACTITIONER

## 2019-10-24 PROCEDURE — 87070 CULTURE OTHR SPECIMN AEROBIC: CPT

## 2019-10-24 PROCEDURE — 99214 PR OFFICE/OUTPT VISIT, EST, LEVL IV, 30-39 MIN: ICD-10-PCS | Mod: 25,S$PBB,, | Performed by: NURSE PRACTITIONER

## 2019-10-24 PROCEDURE — 73562 XR KNEE ORTHO LEFT WITH FLEXION: ICD-10-PCS | Mod: 26,59,RT, | Performed by: RADIOLOGY

## 2019-10-24 PROCEDURE — 20610 PR DRAIN/INJECT LARGE JOINT/BURSA: ICD-10-PCS | Mod: S$PBB,LT,, | Performed by: NURSE PRACTITIONER

## 2019-10-24 PROCEDURE — 73564 X-RAY EXAM KNEE 4 OR MORE: CPT | Mod: 26,LT,, | Performed by: RADIOLOGY

## 2019-10-24 PROCEDURE — 73562 X-RAY EXAM OF KNEE 3: CPT | Mod: 26,59,RT, | Performed by: RADIOLOGY

## 2019-10-24 PROCEDURE — 20610 DRAIN/INJ JOINT/BURSA W/O US: CPT | Mod: S$PBB,LT,, | Performed by: NURSE PRACTITIONER

## 2019-10-24 PROCEDURE — 87205 SMEAR GRAM STAIN: CPT

## 2019-10-24 PROCEDURE — 73564 X-RAY EXAM KNEE 4 OR MORE: CPT | Mod: TC,LT

## 2019-10-24 PROCEDURE — 84560 ASSAY OF URINE/URIC ACID: CPT

## 2019-10-24 PROCEDURE — 87186 SC STD MICRODIL/AGAR DIL: CPT

## 2019-10-24 PROCEDURE — 73564 XR KNEE ORTHO LEFT WITH FLEXION: ICD-10-PCS | Mod: 26,LT,, | Performed by: RADIOLOGY

## 2019-10-24 PROCEDURE — 20610 DRAIN/INJ JOINT/BURSA W/O US: CPT | Mod: PBBFAC | Performed by: NURSE PRACTITIONER

## 2019-10-24 PROCEDURE — 99999 PR PBB SHADOW E&M-EST. PATIENT-LVL IV: CPT | Mod: PBBFAC,,, | Performed by: NURSE PRACTITIONER

## 2019-10-24 PROCEDURE — 89051 BODY FLUID CELL COUNT: CPT

## 2019-10-24 PROCEDURE — 87077 CULTURE AEROBIC IDENTIFY: CPT

## 2019-10-24 PROCEDURE — 99999 PR PBB SHADOW E&M-EST. PATIENT-LVL IV: ICD-10-PCS | Mod: PBBFAC,,, | Performed by: NURSE PRACTITIONER

## 2019-10-24 RX ORDER — NAPROXEN 500 MG/1
500 TABLET ORAL 2 TIMES DAILY WITH MEALS
Qty: 20 TABLET | Refills: 0 | Status: ON HOLD | OUTPATIENT
Start: 2019-10-24 | End: 2019-10-26 | Stop reason: HOSPADM

## 2019-10-24 NOTE — PROGRESS NOTES
"  SUBJECTIVE:     Chief Complaint & History of Present Illness:  Logan Massey is a Established 69 y.o. year old male patient here with a history of constant left knee pain with repeated swelling which started 1-2 weeks ago.  Patient was previously followed [facility] and NAHEED Jonas for osteoarthritis.  He was set for a knee replacement by Dr. [facility] but canceled due to "knee feeling better".  He reports Ms. Moffett had drained his knee back in July and at time got a steroid injection which helped, per note 210 cc of clear yellow fluid was drained.  Last Friday, he went to an urgent care close to his home due to knee swelling.  He reports the provider drained 230 cc of yellow fluid.  He states since that time, the swelling returned causing increased pain to his knee.  His PMH is consistent with Prostate Ca and Leukopenia.  He denies fever or chills.  He states the pain is making it very difficult to walk and he cannot bend his knee without pain.  He denies trauma.      Review of patient's allergies indicates:  No Known Allergies      Current Outpatient Medications   Medication Sig Dispense Refill    cholecalciferol, vitamin D3, (VITAMIN D3) 1,000 unit Chew Take 1,000 Units by mouth 2 (two) times daily.      COLLAGEN MISC 4 capsules by Misc.(Non-Drug; Combo Route) route once daily.      cyanocobalamin (VITAMIN B-12) 1000 MCG tablet Take 1,000 mcg by mouth 2 (two) times daily.      Lactobacillus acidophilus (PROBIOTIC ORAL) Take 1 tablet by mouth once daily.      docusate sodium (COLACE) 100 MG capsule Take 100 mg by mouth once daily.      UNABLE TO FIND Take 2 capsules by mouth 2 (two) times daily. medication name:Steel Libido      UNABLE TO FIND Take 2 tablets by mouth 2 (two) times daily. medication name: davi mushroom      UNABLE TO FIND Take 6 capsules by mouth 2 (two) times daily. medication name: proatacaid      UNABLE TO FIND medication name:pectasol-C  1 scoop three times a day in water  "      No current facility-administered medications for this visit.        Past Medical History:   Diagnosis Date    Arthritis     Prostate cancer        Past Surgical History:   Procedure Laterality Date    HERNIA REPAIR Right     At 5 years- rt groin , Left side - 2014       Family History   Problem Relation Age of Onset    Heart disease Father     Hypertension Father     Hypertension Mother     Heart disease Brother     Hypertension Sister          Review of Systems:  ROS:  Constitutional: no fever or chills  Eyes: no visual changes  ENT: no nasal congestion or sore throat  Respiratory: no cough or shortness of breath  Cardiovascular: no chest pain or palpitations  Gastrointestinal: no nausea or vomiting, tolerating diet  Genitourinary: no hematuria or dysuria  Integument/Breast: no rash or pruritis  Hematologic/Lymphatic: no easy bruising or lymphadenopathy  Musculoskeletal: positive for left knee pain with swelling  Neurological: no seizures or tremors  Behavioral/Psych: no auditory or visual hallucinations  Endocrine: no heat or cold intolerance      OBJECTIVE:     PHYSICAL EXAM:  Vital Signs (Most Recent)  Vitals:    10/24/19 1544   BP: (!) 147/87   Pulse: 97   Resp: 18   Temp: 98.3 °F (36.8 °C)     Height: (nwb) Weight: (nwb),   General Appearance: Well nourished, well developed, in no acute distress.  HENT: Normal cephalic, oropharynx pink and moist  Eyes: PERRLA bilaterally and EOM x 4  Respiratory: Even and unlabored  Skin: Warm and Dry.   Psychiatric: AAO x 4, Mood & affect are normal.    left  Knee Exam:  Knee Range of Motion:limited by pain   Effusion:positive fluid wave  Condition of skin:intact  Location of tenderness:globally   Strength:normal  Stability:  stable to testing, Lachman: stable, LCL: stable, MCL: stable and PCL: stable  Varus /Valgus stress:   Significiant varus  Jesus:   negative    right  Knee Exam:  Knee Range of Motion:normal   Effusion:none  Condition of  skin:intact  Location of tenderness:None   Strength:normal  Stability:  stable to testing, Lachman: stable, LCL: stable, MCL: stable and PCL: stable  Varus /Valgus stress:   Significiant varus  Jesus:   negative      Hip Examination:  full painless range of motion, without tenderness    RADIOGRAPHS:  X-ray of bilateral knees obtained and personally reviewed by me.  Patient has significant tibiofemoral joint space narrowing bilaterally.  No fractures or dislocations seen.  Appears to have a large joint effusion about the left knee.    ASSESSMENT/PLAN:       ICD-10-CM ICD-9-CM   1. Primary osteoarthritis of left knee M17.12 715.16   2. Effusion of bursa of left knee M25.462 719.06       Plan: We discussed with the patient at length all the different treatment options available for  the knee including anti-inflammatories, acetaminophen, rest, ice, knee strengthening exercise, occasional cortisone injections for temporary relief, Viscosupplimentation injections, arthroscopic debridement osteotomy, and finally knee arthroplasty.     -Patient presents with left knee swelling an pain x 2 weeks.  -Per patient, knee drained on Friday, 230 cc's reported drained from local urgent care.  -X-ray as above.  -Will attempt to aspirate today and send specimen for uric acid, culture and cell count.  -Will do MRI to r/o other causes of repeated large volume fluid collection.  -Will have patient follow up with [facility] Jonas next week.  -Recommend patient wear hinged knee brace when out of bed  -Recommend he keep knee elevated above heart as much as possible.  -Recommend ice PRN.    PROCEDURE:  I have explained the risks, benefits, and alternatives of the procedure in detail.  The patient voices understanding and all questions have been answered.  The patient agrees to proceed as planned. So after I performed a sterile prep of the skin in the normal fashion the left knee is injected using a 22 gauge needle from the anterolateral  "approach with a  2cc 1% plain lidocaine.  I then aspirated the knee, approximately 38 cc's was aspirated.  Then the knee was wrapped in 6" ace wrap and he was placed into a hinged knee brace.  He should wear ace wrapping for 2-3 days and he can removed ace wrapping at night The patient is cautioned and immediate relief of pain is secondary to the local anesthetic and will be temporary.  After the anesthetic wears off there may be a increase in pain that may last for a few hours or a few days and they should use ice to help alleviate this flair up of pain.     Patient tolerated procedure well and post injection they reported improvement in their pain.  "

## 2019-10-25 ENCOUNTER — HOSPITAL ENCOUNTER (OUTPATIENT)
Dept: RADIOLOGY | Facility: HOSPITAL | Age: 69
Discharge: HOME | DRG: 487 | End: 2019-10-25
Attending: NURSE PRACTITIONER
Payer: MEDICARE

## 2019-10-25 ENCOUNTER — TELEPHONE (OUTPATIENT)
Dept: ORTHOPEDICS | Facility: HOSPITAL | Age: 69
End: 2019-10-25

## 2019-10-25 ENCOUNTER — PATIENT MESSAGE (OUTPATIENT)
Dept: ORTHOPEDICS | Facility: CLINIC | Age: 69
End: 2019-10-25

## 2019-10-25 DIAGNOSIS — M17.12 PRIMARY OSTEOARTHRITIS OF LEFT KNEE: ICD-10-CM

## 2019-10-25 DIAGNOSIS — M00.9 PYOGENIC ARTHRITIS OF LEFT KNEE JOINT, DUE TO UNSPECIFIED ORGANISM: Primary | ICD-10-CM

## 2019-10-25 PROCEDURE — 73721 MRI KNEE WITHOUT CONTRAST LEFT: ICD-10-PCS | Mod: 26,LT,, | Performed by: RADIOLOGY

## 2019-10-25 PROCEDURE — 73721 MRI JNT OF LWR EXTRE W/O DYE: CPT | Mod: 26,LT,, | Performed by: RADIOLOGY

## 2019-10-25 PROCEDURE — 73721 MRI JNT OF LWR EXTRE W/O DYE: CPT | Mod: TC,LT

## 2019-10-26 ENCOUNTER — HOSPITAL ENCOUNTER (INPATIENT)
Facility: HOSPITAL | Age: 69
LOS: 4 days | Discharge: HOME HEALTH | DRG: 487 | End: 2019-10-30
Attending: ORTHOPAEDIC SURGERY | Admitting: ORTHOPAEDIC SURGERY
Payer: MEDICARE

## 2019-10-26 ENCOUNTER — ANESTHESIA EVENT (OUTPATIENT)
Dept: SURGERY | Facility: HOSPITAL | Age: 69
DRG: 487 | End: 2019-10-26
Payer: MEDICARE

## 2019-10-26 ENCOUNTER — ANESTHESIA (OUTPATIENT)
Dept: SURGERY | Facility: HOSPITAL | Age: 69
DRG: 487 | End: 2019-10-26
Payer: MEDICARE

## 2019-10-26 DIAGNOSIS — B99.9 INFECTION: ICD-10-CM

## 2019-10-26 DIAGNOSIS — M00.9 SEPTIC ARTHRITIS OF KNEE, LEFT: ICD-10-CM

## 2019-10-26 DIAGNOSIS — M00.062 STAPHYLOCOCCAL ARTHRITIS OF LEFT KNEE: ICD-10-CM

## 2019-10-26 PROBLEM — D75.89 MACROCYTOSIS: Status: RESOLVED | Noted: 2019-08-20 | Resolved: 2019-10-26

## 2019-10-26 LAB
ALBUMIN SERPL BCP-MCNC: 2.7 G/DL (ref 3.5–5.2)
ALP SERPL-CCNC: 64 U/L (ref 55–135)
ALT SERPL W/O P-5'-P-CCNC: 51 U/L (ref 10–44)
ANION GAP SERPL CALC-SCNC: 9 MMOL/L (ref 8–16)
APPEARANCE FLD: NORMAL
AST SERPL-CCNC: 22 U/L (ref 10–40)
BASOPHILS # BLD AUTO: 0.04 K/UL (ref 0–0.2)
BASOPHILS NFR BLD: 0.6 % (ref 0–1.9)
BILIRUB SERPL-MCNC: 0.9 MG/DL (ref 0.1–1)
BODY FLD TYPE: NORMAL
BUN SERPL-MCNC: 18 MG/DL (ref 8–23)
CALCIUM SERPL-MCNC: 9.3 MG/DL (ref 8.7–10.5)
CHLORIDE SERPL-SCNC: 104 MMOL/L (ref 95–110)
CO2 SERPL-SCNC: 26 MMOL/L (ref 23–29)
COLOR FLD: YELLOW
CREAT SERPL-MCNC: 0.8 MG/DL (ref 0.5–1.4)
CRP SERPL-MCNC: 176.8 MG/L (ref 0–8.2)
DIFFERENTIAL METHOD: ABNORMAL
EOSINOPHIL # BLD AUTO: 0.2 K/UL (ref 0–0.5)
EOSINOPHIL NFR BLD: 2.2 % (ref 0–8)
ERYTHROCYTE [DISTWIDTH] IN BLOOD BY AUTOMATED COUNT: 12.7 % (ref 11.5–14.5)
ERYTHROCYTE [SEDIMENTATION RATE] IN BLOOD BY WESTERGREN METHOD: 98 MM/HR (ref 0–23)
EST. GFR  (AFRICAN AMERICAN): >60 ML/MIN/1.73 M^2
EST. GFR  (NON AFRICAN AMERICAN): >60 ML/MIN/1.73 M^2
GLUCOSE SERPL-MCNC: 86 MG/DL (ref 70–110)
HCT VFR BLD AUTO: 36.8 % (ref 40–54)
HGB BLD-MCNC: 11.7 G/DL (ref 14–18)
IMM GRANULOCYTES # BLD AUTO: 0.02 K/UL (ref 0–0.04)
IMM GRANULOCYTES NFR BLD AUTO: 0.3 % (ref 0–0.5)
LYMPHOCYTES # BLD AUTO: 1.5 K/UL (ref 1–4.8)
LYMPHOCYTES NFR BLD: 22.7 % (ref 18–48)
LYMPHOCYTES NFR FLD MANUAL: 2 %
MCH RBC QN AUTO: 31.2 PG (ref 27–31)
MCHC RBC AUTO-ENTMCNC: 31.8 G/DL (ref 32–36)
MCV RBC AUTO: 98 FL (ref 82–98)
MONOCYTES # BLD AUTO: 0.6 K/UL (ref 0.3–1)
MONOCYTES NFR BLD: 9.3 % (ref 4–15)
MONOS+MACROS NFR FLD MANUAL: 2 %
NEUTROPHILS # BLD AUTO: 4.4 K/UL (ref 1.8–7.7)
NEUTROPHILS NFR BLD: 64.9 % (ref 38–73)
NEUTROPHILS NFR FLD MANUAL: 96 %
NRBC BLD-RTO: 0 /100 WBC
PLATELET # BLD AUTO: 429 K/UL (ref 150–350)
PMV BLD AUTO: 9.6 FL (ref 9.2–12.9)
POTASSIUM SERPL-SCNC: 4.5 MMOL/L (ref 3.5–5.1)
PROCALCITONIN SERPL IA-MCNC: 0.06 NG/ML
PROT SERPL-MCNC: 6.8 G/DL (ref 6–8.4)
RBC # BLD AUTO: 3.75 M/UL (ref 4.6–6.2)
SODIUM SERPL-SCNC: 139 MMOL/L (ref 136–145)
WBC # BLD AUTO: 6.78 K/UL (ref 3.9–12.7)
WBC # FLD: 7420 /CU MM

## 2019-10-26 PROCEDURE — 63600175 PHARM REV CODE 636 W HCPCS

## 2019-10-26 PROCEDURE — 63600175 PHARM REV CODE 636 W HCPCS: Performed by: STUDENT IN AN ORGANIZED HEALTH CARE EDUCATION/TRAINING PROGRAM

## 2019-10-26 PROCEDURE — 71000033 HC RECOVERY, INTIAL HOUR: Performed by: ORTHOPAEDIC SURGERY

## 2019-10-26 PROCEDURE — 99223 PR INITIAL HOSPITAL CARE,LEVL III: ICD-10-PCS | Mod: 57,GC,ICN, | Performed by: ORTHOPAEDIC SURGERY

## 2019-10-26 PROCEDURE — 87102 FUNGUS ISOLATION CULTURE: CPT | Mod: 59

## 2019-10-26 PROCEDURE — 63600175 PHARM REV CODE 636 W HCPCS: Performed by: NURSE ANESTHETIST, CERTIFIED REGISTERED

## 2019-10-26 PROCEDURE — 87205 SMEAR GRAM STAIN: CPT

## 2019-10-26 PROCEDURE — C1729 CATH, DRAINAGE: HCPCS | Performed by: ORTHOPAEDIC SURGERY

## 2019-10-26 PROCEDURE — 11000001 HC ACUTE MED/SURG PRIVATE ROOM

## 2019-10-26 PROCEDURE — 87075 CULTR BACTERIA EXCEPT BLOOD: CPT

## 2019-10-26 PROCEDURE — 36415 COLL VENOUS BLD VENIPUNCTURE: CPT

## 2019-10-26 PROCEDURE — 86140 C-REACTIVE PROTEIN: CPT

## 2019-10-26 PROCEDURE — 87206 SMEAR FLUORESCENT/ACID STAI: CPT | Mod: 91

## 2019-10-26 PROCEDURE — 37000008 HC ANESTHESIA 1ST 15 MINUTES: Performed by: ORTHOPAEDIC SURGERY

## 2019-10-26 PROCEDURE — 63600175 PHARM REV CODE 636 W HCPCS: Performed by: ORTHOPAEDIC SURGERY

## 2019-10-26 PROCEDURE — 99223 1ST HOSP IP/OBS HIGH 75: CPT | Mod: 57,GC,ICN, | Performed by: ORTHOPAEDIC SURGERY

## 2019-10-26 PROCEDURE — 29880 ARTHRS KNE SRG MNISECTMY M&L: CPT | Mod: LT,GC,ICN, | Performed by: ORTHOPAEDIC SURGERY

## 2019-10-26 PROCEDURE — 85025 COMPLETE CBC W/AUTO DIFF WBC: CPT

## 2019-10-26 PROCEDURE — D9220A PRA ANESTHESIA: Mod: CRNA,,, | Performed by: NURSE ANESTHETIST, CERTIFIED REGISTERED

## 2019-10-26 PROCEDURE — 87116 MYCOBACTERIA CULTURE: CPT

## 2019-10-26 PROCEDURE — 80053 COMPREHEN METABOLIC PANEL: CPT

## 2019-10-26 PROCEDURE — 36000711: Performed by: ORTHOPAEDIC SURGERY

## 2019-10-26 PROCEDURE — 25000003 PHARM REV CODE 250: Performed by: STUDENT IN AN ORGANIZED HEALTH CARE EDUCATION/TRAINING PROGRAM

## 2019-10-26 PROCEDURE — 84145 PROCALCITONIN (PCT): CPT

## 2019-10-26 PROCEDURE — 63600175 PHARM REV CODE 636 W HCPCS: Performed by: ANESTHESIOLOGY

## 2019-10-26 PROCEDURE — 87070 CULTURE OTHR SPECIMN AEROBIC: CPT | Mod: 59

## 2019-10-26 PROCEDURE — 85652 RBC SED RATE AUTOMATED: CPT

## 2019-10-26 PROCEDURE — 29880 PR KNEE SCOPE MED/LAT MENISCECTOMY: ICD-10-PCS | Mod: LT,GC,ICN, | Performed by: ORTHOPAEDIC SURGERY

## 2019-10-26 PROCEDURE — D9220A PRA ANESTHESIA: Mod: ANES,,, | Performed by: ANESTHESIOLOGY

## 2019-10-26 PROCEDURE — D9220A PRA ANESTHESIA: ICD-10-PCS | Mod: CRNA,,, | Performed by: NURSE ANESTHETIST, CERTIFIED REGISTERED

## 2019-10-26 PROCEDURE — 25000003 PHARM REV CODE 250

## 2019-10-26 PROCEDURE — 27201423 OPTIME MED/SURG SUP & DEVICES STERILE SUPPLY: Performed by: ORTHOPAEDIC SURGERY

## 2019-10-26 PROCEDURE — 36000710: Performed by: ORTHOPAEDIC SURGERY

## 2019-10-26 PROCEDURE — 37000009 HC ANESTHESIA EA ADD 15 MINS: Performed by: ORTHOPAEDIC SURGERY

## 2019-10-26 PROCEDURE — D9220A PRA ANESTHESIA: ICD-10-PCS | Mod: ANES,,, | Performed by: ANESTHESIOLOGY

## 2019-10-26 PROCEDURE — 25000003 PHARM REV CODE 250: Performed by: NURSE ANESTHETIST, CERTIFIED REGISTERED

## 2019-10-26 RX ORDER — DOCUSATE SODIUM 100 MG/1
100 CAPSULE, LIQUID FILLED ORAL DAILY
Status: DISCONTINUED | OUTPATIENT
Start: 2019-10-27 | End: 2019-10-30 | Stop reason: HOSPADM

## 2019-10-26 RX ORDER — ONDANSETRON 8 MG/1
8 TABLET, ORALLY DISINTEGRATING ORAL EVERY 8 HOURS PRN
Status: DISCONTINUED | OUTPATIENT
Start: 2019-10-26 | End: 2019-10-30 | Stop reason: HOSPADM

## 2019-10-26 RX ORDER — HYDRALAZINE HYDROCHLORIDE 20 MG/ML
INJECTION INTRAMUSCULAR; INTRAVENOUS
Status: COMPLETED
Start: 2019-10-26 | End: 2019-10-26

## 2019-10-26 RX ORDER — LORAZEPAM 2 MG/ML
0.25 INJECTION INTRAMUSCULAR ONCE AS NEEDED
Status: DISCONTINUED | OUTPATIENT
Start: 2019-10-26 | End: 2019-10-26 | Stop reason: HOSPADM

## 2019-10-26 RX ORDER — OXYCODONE HYDROCHLORIDE 10 MG/1
10 TABLET ORAL EVERY 4 HOURS PRN
Status: DISCONTINUED | OUTPATIENT
Start: 2019-10-26 | End: 2019-10-30 | Stop reason: HOSPADM

## 2019-10-26 RX ORDER — DOCUSATE SODIUM 100 MG/1
100 CAPSULE, LIQUID FILLED ORAL 3 TIMES DAILY
Qty: 90 CAPSULE | Refills: 0 | Status: SHIPPED | OUTPATIENT
Start: 2019-10-26

## 2019-10-26 RX ORDER — MUPIROCIN 20 MG/G
OINTMENT TOPICAL
Status: DISCONTINUED | OUTPATIENT
Start: 2019-10-26 | End: 2019-10-26 | Stop reason: HOSPADM

## 2019-10-26 RX ORDER — HYDRALAZINE HYDROCHLORIDE 20 MG/ML
10 INJECTION INTRAMUSCULAR; INTRAVENOUS ONCE
Status: COMPLETED | OUTPATIENT
Start: 2019-10-26 | End: 2019-10-26

## 2019-10-26 RX ORDER — FENTANYL CITRATE 50 UG/ML
INJECTION, SOLUTION INTRAMUSCULAR; INTRAVENOUS
Status: DISCONTINUED | OUTPATIENT
Start: 2019-10-26 | End: 2019-10-26

## 2019-10-26 RX ORDER — OXYCODONE AND ACETAMINOPHEN 5; 325 MG/1; MG/1
1 TABLET ORAL EVERY 4 HOURS PRN
Qty: 45 TABLET | Refills: 0 | Status: SHIPPED | OUTPATIENT
Start: 2019-10-26 | End: 2019-11-12 | Stop reason: SDUPTHER

## 2019-10-26 RX ORDER — LIDOCAINE HCL/PF 100 MG/5ML
SYRINGE (ML) INTRAVENOUS
Status: DISCONTINUED | OUTPATIENT
Start: 2019-10-26 | End: 2019-10-26

## 2019-10-26 RX ORDER — SODIUM CHLORIDE 9 MG/ML
INJECTION, SOLUTION INTRAVENOUS CONTINUOUS
Status: DISCONTINUED | OUTPATIENT
Start: 2019-10-26 | End: 2019-10-30 | Stop reason: HOSPADM

## 2019-10-26 RX ORDER — NEOSTIGMINE METHYLSULFATE 0.5 MG/ML
INJECTION, SOLUTION INTRAVENOUS
Status: DISCONTINUED | OUTPATIENT
Start: 2019-10-26 | End: 2019-10-26

## 2019-10-26 RX ORDER — VANCOMYCIN HCL IN 5 % DEXTROSE 1G/250ML
1000 PLASTIC BAG, INJECTION (ML) INTRAVENOUS
Status: DISCONTINUED | OUTPATIENT
Start: 2019-10-27 | End: 2019-10-28 | Stop reason: ALTCHOICE

## 2019-10-26 RX ORDER — LABETALOL HCL 20 MG/4 ML
SYRINGE (ML) INTRAVENOUS
Status: COMPLETED
Start: 2019-10-26 | End: 2019-10-26

## 2019-10-26 RX ORDER — SUCCINYLCHOLINE CHLORIDE 20 MG/ML
INJECTION INTRAMUSCULAR; INTRAVENOUS
Status: DISCONTINUED | OUTPATIENT
Start: 2019-10-26 | End: 2019-10-26

## 2019-10-26 RX ORDER — ROCURONIUM BROMIDE 10 MG/ML
INJECTION, SOLUTION INTRAVENOUS
Status: DISCONTINUED | OUTPATIENT
Start: 2019-10-26 | End: 2019-10-26

## 2019-10-26 RX ORDER — MEPERIDINE HYDROCHLORIDE 50 MG/ML
12.5 INJECTION INTRAMUSCULAR; INTRAVENOUS; SUBCUTANEOUS ONCE AS NEEDED
Status: DISCONTINUED | OUTPATIENT
Start: 2019-10-26 | End: 2019-10-26 | Stop reason: HOSPADM

## 2019-10-26 RX ORDER — RAMELTEON 8 MG/1
8 TABLET ORAL NIGHTLY PRN
Status: DISCONTINUED | OUTPATIENT
Start: 2019-10-26 | End: 2019-10-30 | Stop reason: HOSPADM

## 2019-10-26 RX ORDER — ONDANSETRON 2 MG/ML
4 INJECTION INTRAMUSCULAR; INTRAVENOUS EVERY 12 HOURS PRN
Status: DISCONTINUED | OUTPATIENT
Start: 2019-10-26 | End: 2019-10-30 | Stop reason: HOSPADM

## 2019-10-26 RX ORDER — LIDOCAINE HYDROCHLORIDE 10 MG/ML
1 INJECTION, SOLUTION EPIDURAL; INFILTRATION; INTRACAUDAL; PERINEURAL ONCE
Status: DISCONTINUED | OUTPATIENT
Start: 2019-10-26 | End: 2019-10-30 | Stop reason: HOSPADM

## 2019-10-26 RX ORDER — CEFAZOLIN SODIUM 1 G/3ML
2 INJECTION, POWDER, FOR SOLUTION INTRAMUSCULAR; INTRAVENOUS
Status: COMPLETED | OUTPATIENT
Start: 2019-10-26 | End: 2019-10-26

## 2019-10-26 RX ORDER — EPINEPHRINE 1 MG/ML
INJECTION INTRAMUSCULAR; INTRAVENOUS; SUBCUTANEOUS
Status: DISCONTINUED | OUTPATIENT
Start: 2019-10-26 | End: 2019-10-26 | Stop reason: HOSPADM

## 2019-10-26 RX ORDER — ACETAMINOPHEN 325 MG/1
650 TABLET ORAL EVERY 4 HOURS PRN
Status: DISCONTINUED | OUTPATIENT
Start: 2019-10-26 | End: 2019-10-30 | Stop reason: HOSPADM

## 2019-10-26 RX ORDER — ONDANSETRON 2 MG/ML
INJECTION INTRAMUSCULAR; INTRAVENOUS
Status: DISCONTINUED | OUTPATIENT
Start: 2019-10-26 | End: 2019-10-26

## 2019-10-26 RX ORDER — LABETALOL HYDROCHLORIDE 5 MG/ML
10 INJECTION, SOLUTION INTRAVENOUS ONCE
Status: COMPLETED | OUTPATIENT
Start: 2019-10-26 | End: 2019-10-26

## 2019-10-26 RX ORDER — GLYCOPYRROLATE 0.2 MG/ML
INJECTION INTRAMUSCULAR; INTRAVENOUS
Status: DISCONTINUED | OUTPATIENT
Start: 2019-10-26 | End: 2019-10-26

## 2019-10-26 RX ORDER — OXYCODONE HYDROCHLORIDE 5 MG/1
5 TABLET ORAL EVERY 4 HOURS PRN
Status: DISCONTINUED | OUTPATIENT
Start: 2019-10-26 | End: 2019-10-30 | Stop reason: HOSPADM

## 2019-10-26 RX ORDER — HYDROMORPHONE HYDROCHLORIDE 1 MG/ML
0.2 INJECTION, SOLUTION INTRAMUSCULAR; INTRAVENOUS; SUBCUTANEOUS EVERY 5 MIN PRN
Status: COMPLETED | OUTPATIENT
Start: 2019-10-26 | End: 2019-10-26

## 2019-10-26 RX ORDER — MIDAZOLAM HYDROCHLORIDE 1 MG/ML
INJECTION, SOLUTION INTRAMUSCULAR; INTRAVENOUS
Status: DISCONTINUED | OUTPATIENT
Start: 2019-10-26 | End: 2019-10-26

## 2019-10-26 RX ORDER — ONDANSETRON 4 MG/1
8 TABLET, FILM COATED ORAL EVERY 8 HOURS PRN
Qty: 20 TABLET | Refills: 0 | Status: SHIPPED | OUTPATIENT
Start: 2019-10-26

## 2019-10-26 RX ORDER — PHENYLEPHRINE HYDROCHLORIDE 10 MG/ML
INJECTION INTRAVENOUS
Status: DISCONTINUED | OUTPATIENT
Start: 2019-10-26 | End: 2019-10-26

## 2019-10-26 RX ORDER — VANCOMYCIN HCL IN 5 % DEXTROSE 1G/250ML
1000 PLASTIC BAG, INJECTION (ML) INTRAVENOUS
Status: COMPLETED | OUTPATIENT
Start: 2019-10-26 | End: 2019-10-26

## 2019-10-26 RX ORDER — SODIUM CHLORIDE 0.9 % (FLUSH) 0.9 %
10 SYRINGE (ML) INJECTION
Status: DISCONTINUED | OUTPATIENT
Start: 2019-10-26 | End: 2019-10-30 | Stop reason: HOSPADM

## 2019-10-26 RX ORDER — PROPOFOL 10 MG/ML
VIAL (ML) INTRAVENOUS
Status: DISCONTINUED | OUTPATIENT
Start: 2019-10-26 | End: 2019-10-26

## 2019-10-26 RX ORDER — LIDOCAINE HYDROCHLORIDE 10 MG/ML
1 INJECTION, SOLUTION EPIDURAL; INFILTRATION; INTRACAUDAL; PERINEURAL ONCE
Status: DISCONTINUED | OUTPATIENT
Start: 2019-10-26 | End: 2019-10-26 | Stop reason: HOSPADM

## 2019-10-26 RX ORDER — ACETAMINOPHEN 325 MG/1
650 TABLET ORAL EVERY 8 HOURS PRN
Status: DISCONTINUED | OUTPATIENT
Start: 2019-10-26 | End: 2019-10-30 | Stop reason: HOSPADM

## 2019-10-26 RX ORDER — NAPROXEN 500 MG/1
500 TABLET ORAL 2 TIMES DAILY WITH MEALS
Status: DISCONTINUED | OUTPATIENT
Start: 2019-10-26 | End: 2019-10-30 | Stop reason: HOSPADM

## 2019-10-26 RX ADMIN — NAPROXEN 500 MG: 500 TABLET ORAL at 06:10

## 2019-10-26 RX ADMIN — OXYCODONE HYDROCHLORIDE 10 MG: 10 TABLET ORAL at 05:10

## 2019-10-26 RX ADMIN — ACETAMINOPHEN 650 MG: 325 TABLET ORAL at 05:10

## 2019-10-26 RX ADMIN — ROCURONIUM BROMIDE 5 MG: 10 INJECTION, SOLUTION INTRAVENOUS at 02:10

## 2019-10-26 RX ADMIN — ROCURONIUM BROMIDE 20 MG: 10 INJECTION, SOLUTION INTRAVENOUS at 02:10

## 2019-10-26 RX ADMIN — HYDROMORPHONE HYDROCHLORIDE 0.2 MG: 1 INJECTION, SOLUTION INTRAMUSCULAR; INTRAVENOUS; SUBCUTANEOUS at 05:10

## 2019-10-26 RX ADMIN — PHENYLEPHRINE HYDROCHLORIDE 200 MCG: 10 INJECTION INTRAVENOUS at 02:10

## 2019-10-26 RX ADMIN — GLYCOPYRROLATE 0.6 MG: 0.2 INJECTION, SOLUTION INTRAMUSCULAR; INTRAVENOUS at 03:10

## 2019-10-26 RX ADMIN — CEFAZOLIN 2 G: 330 INJECTION, POWDER, FOR SOLUTION INTRAMUSCULAR; INTRAVENOUS at 03:10

## 2019-10-26 RX ADMIN — MUPIROCIN: 20 OINTMENT TOPICAL at 10:10

## 2019-10-26 RX ADMIN — NEOSTIGMINE METHYLSULFATE 5 MG: 0.5 INJECTION INTRAVENOUS at 03:10

## 2019-10-26 RX ADMIN — PHENYLEPHRINE HYDROCHLORIDE 100 MCG: 10 INJECTION INTRAVENOUS at 03:10

## 2019-10-26 RX ADMIN — ONDANSETRON 4 MG: 2 INJECTION INTRAMUSCULAR; INTRAVENOUS at 03:10

## 2019-10-26 RX ADMIN — HYDROMORPHONE HYDROCHLORIDE 0.2 MG: 1 INJECTION, SOLUTION INTRAMUSCULAR; INTRAVENOUS; SUBCUTANEOUS at 04:10

## 2019-10-26 RX ADMIN — MIDAZOLAM HYDROCHLORIDE 2 MG: 1 INJECTION, SOLUTION INTRAMUSCULAR; INTRAVENOUS at 02:10

## 2019-10-26 RX ADMIN — PROPOFOL 200 MG: 10 INJECTION, EMULSION INTRAVENOUS at 02:10

## 2019-10-26 RX ADMIN — Medication 1000 MG: at 03:10

## 2019-10-26 RX ADMIN — LABETALOL HYDROCHLORIDE 10 MG: 5 INJECTION, SOLUTION INTRAVENOUS at 05:10

## 2019-10-26 RX ADMIN — LABETALOL HCL IV SOLN PREFILLED SYRINGE 20 MG/4ML (5 MG/ML) 10 MG: 20/4 SOLUTION PREFILLED SYRINGE at 05:10

## 2019-10-26 RX ADMIN — CEFTRIAXONE 2 G: 2 INJECTION, SOLUTION INTRAVENOUS at 05:10

## 2019-10-26 RX ADMIN — HYDRALAZINE HYDROCHLORIDE 10 MG: 20 INJECTION INTRAMUSCULAR; INTRAVENOUS at 06:10

## 2019-10-26 RX ADMIN — SODIUM CHLORIDE, SODIUM GLUCONATE, SODIUM ACETATE, POTASSIUM CHLORIDE, MAGNESIUM CHLORIDE, SODIUM PHOSPHATE, DIBASIC, AND POTASSIUM PHOSPHATE: .53; .5; .37; .037; .03; .012; .00082 INJECTION, SOLUTION INTRAVENOUS at 03:10

## 2019-10-26 RX ADMIN — SUCCINYLCHOLINE CHLORIDE 140 MG: 20 INJECTION, SOLUTION INTRAMUSCULAR; INTRAVENOUS at 02:10

## 2019-10-26 RX ADMIN — FENTANYL CITRATE 25 MCG: 50 INJECTION, SOLUTION INTRAMUSCULAR; INTRAVENOUS at 03:10

## 2019-10-26 RX ADMIN — FENTANYL CITRATE 50 MCG: 50 INJECTION, SOLUTION INTRAMUSCULAR; INTRAVENOUS at 03:10

## 2019-10-26 RX ADMIN — LABETALOL HCL IV SOLN PREFILLED SYRINGE 20 MG/4ML (5 MG/ML) 10 MG: 20/4 SOLUTION PREFILLED SYRINGE at 06:10

## 2019-10-26 RX ADMIN — LIDOCAINE HYDROCHLORIDE 100 MG: 20 INJECTION, SOLUTION INTRAVENOUS at 02:10

## 2019-10-26 RX ADMIN — SODIUM CHLORIDE: 0.9 INJECTION, SOLUTION INTRAVENOUS at 10:10

## 2019-10-26 RX ADMIN — PROPOFOL 50 MG: 10 INJECTION, EMULSION INTRAVENOUS at 02:10

## 2019-10-26 RX ADMIN — FENTANYL CITRATE 100 MCG: 50 INJECTION, SOLUTION INTRAMUSCULAR; INTRAVENOUS at 02:10

## 2019-10-26 RX ADMIN — FENTANYL CITRATE 25 MCG: 50 INJECTION, SOLUTION INTRAMUSCULAR; INTRAVENOUS at 02:10

## 2019-10-26 NOTE — ASSESSMENT & PLAN NOTE
Logan Massey is a 69 y.o. male with septic arthritis of the L knee   - NWB LLE  - LLE currently in hinged knee brace for comfort  - NPO for surgery  - hold abx until cultures taken in surgery    Surgical vs nonsurgical options discussed with patient and wife at length. They verbalize full understanding and wish to proceed with surgery at this time. He has been NPO since 4am. Surgery scheduled for noon. Patient marked, booked, and consented.

## 2019-10-26 NOTE — TELEPHONE ENCOUNTER
69-year-old male longstanding left knee osteoarthritis.  Now with 2 weeks of increased knee pain swelling and inability bear weight.  Seen at an outside urgent care 1 week ago.  At that time knee was aspirated with approximately 200 mL of fluid drained.  No labs are available from an outside hospital visit.      He was seen in the Orthopedic Clinic 10/24/2019.  His knee was aspirated and sent for fluid analysis and culture.  Culture positive for gram positive cocci.    Called patient discussed lab findings.    Feel that is likely that he has a septic arthritis of his knee.  Recommended washout of knee    Plan  washout of left knee Saturday  Patient will either be able to check in to surgery center or ER pending further coordination with hospital staff and orthopedic team.

## 2019-10-26 NOTE — ANESTHESIA POSTPROCEDURE EVALUATION
Anesthesia Post Evaluation    Patient: Logan Massey    Procedure(s) Performed: Procedure(s) (LRB):  ARTHROSCOPY, KNEE, WITH DEBRIDEMENT (Left)    Final Anesthesia Type: general  Patient location during evaluation: PACU  Patient participation: Yes- Able to Participate  Level of consciousness: awake and alert  Post-procedure vital signs: reviewed and stable  Pain management: adequate  Airway patency: patent  PONV status at discharge: No PONV  Anesthetic complications: no      Cardiovascular status: hemodynamically stable  Respiratory status: unassisted  Hydration status: euvolemic  Follow-up not needed.          Vitals Value Taken Time   /99 10/26/2019  5:24 PM   Temp 36.6 °C (97.9 °F) 10/26/2019  4:00 PM   Pulse 72 10/26/2019  5:30 PM   Resp 23 10/26/2019  5:30 PM   SpO2 99 % 10/26/2019  5:30 PM   Vitals shown include unvalidated device data.      No case tracking events are documented in the log.      Pain/Angel Score: Pain Rating Prior to Med Admin: 6 (10/26/2019  5:10 PM)

## 2019-10-26 NOTE — SUBJECTIVE & OBJECTIVE
"Past Medical History:   Diagnosis Date    Arthritis     Prostate cancer        Past Surgical History:   Procedure Laterality Date    HERNIA REPAIR Right     At 5 years- rt groin , Left side - 2014       Review of patient's allergies indicates:  No Known Allergies    Current Facility-Administered Medications   Medication    0.9%  NaCl infusion    0.9%  NaCl infusion    ceFAZolin injection 2 g    lidocaine (PF) 10 mg/ml (1%) injection 10 mg    mupirocin 2 % ointment    vancomycin in dextrose 5 % 1 gram/250 mL IVPB 1,000 mg     Family History     Problem Relation (Age of Onset)    Heart disease Father, Brother    Hypertension Father, Mother, Sister        Tobacco Use    Smoking status: Never Smoker    Smokeless tobacco: Never Used   Substance and Sexual Activity    Alcohol use: No    Drug use: Not Currently     Comment: no IVDU    Sexual activity: Not on file     ROS   Constitutional: negative for fevers  Eyes: negative visual changes  ENT: negative for hearing loss  Respiratory: negative for dyspnea  Cardiovascular: negative for chest pain  Gastrointestinal: negative for abdominal pain  Genitourinary: negative for dysuria  Neurological: negative for headaches  Behavioral/Psych: negative for hallucinations  Endocrine: negative for temperature intolerance    Objective:     Vital Signs (Most Recent):  Temp: 97.8 °F (36.6 °C) (10/26/19 1015)  Pulse: 84 (10/26/19 1015)  Resp: 16 (10/26/19 1015)  BP: (!) 156/90 (10/26/19 1016)  SpO2: 99 % (10/26/19 1015) Vital Signs (24h Range):  Temp:  [97.8 °F (36.6 °C)] 97.8 °F (36.6 °C)  Pulse:  [84] 84  Resp:  [16] 16  SpO2:  [99 %] 99 %  BP: (156)/(90) 156/90     Weight: 79.4 kg (175 lb)  Height: 5' 11" (180.3 cm)  Body mass index is 24.41 kg/m².    Ortho/SPM Exam   LLE:  Skin intact throughout, no scars present  Significant swelling around the knee and suprapatellar region  No ecchymosis  TTP at all aspects of the knee  No joint line tenderness   No tenderness to " palpation of proximal, middle, or distal aspects of femur, tibia, or fibula  No tenderness to palpation of foot  ROM at knee limited due to pain      Flexion: 45 degrees      Extension: 10 degrees  Full painless ROM of hip and ankle  Compartments soft  SILT Sa/[place]illas/DP/SP/T  Motor intact EHL/FHL/TA/Gastroc  2+ DP  Brisk capillary refill     All other joints (shoulder/elbow/wrist/hip/knee/ankle) were examined and had full ROM and were non-tender to palpation.        Significant Labs: All pertinent labs within the past 24 hours have been reviewed.    Significant Imaging: I have reviewed all pertinent imaging results/findings.

## 2019-10-26 NOTE — H&P
[facility]  Orthopedics  H&P    Patient Name: Logan Massey  MRN: [id number]  Admission Date: 10/26/2019  Primary Care Provider: Silvestre Celeste DO        Subjective:     Principal Problem:Septic arthritis of knee, left    Chief Complaint: L knee pain     HPI: Logan Massey is a 69 y.o. male with PMH left knee arthritis, G6PD deficiency, and prostate cancer who presents with swelling and pain of the left knee. He states that he presented to his PCP 1 week ago and had his knee aspirated. He states that his pain and swelling came back a few days later. He presented to orthopedics clinic 2 days ago where the knee was aspirated again. Cultures from this aspiration are growing GPCs resembling staph. He denies any fevers, recent illnesses, or recent procedures/dental work. He presents to [facility] for surgery today to treat his infection.     Past Medical History:   Diagnosis Date    Arthritis     Prostate cancer        Past Surgical History:   Procedure Laterality Date    HERNIA REPAIR Right     At 5 years- rt groin , Left side - 2014       Review of patient's allergies indicates:  No Known Allergies    Current Facility-Administered Medications   Medication    0.9%  NaCl infusion    0.9%  NaCl infusion    ceFAZolin injection 2 g    lidocaine (PF) 10 mg/ml (1%) injection 10 mg    mupirocin 2 % ointment    vancomycin in dextrose 5 % 1 gram/250 mL IVPB 1,000 mg     Family History     Problem Relation (Age of Onset)    Heart disease Father, Brother    Hypertension Father, Mother, Sister        Tobacco Use    Smoking status: Never Smoker    Smokeless tobacco: Never Used   Substance and Sexual Activity    Alcohol use: No    Drug use: Not Currently     Comment: no IVDU    Sexual activity: Not on file     ROS   Constitutional: negative for fevers  Eyes: negative visual changes  ENT: negative for hearing loss  Respiratory: negative for dyspnea  Cardiovascular: negative for chest pain  Gastrointestinal:  "negative for abdominal pain  Genitourinary: negative for dysuria  Neurological: negative for headaches  Behavioral/Psych: negative for hallucinations  Endocrine: negative for temperature intolerance    Objective:     Vital Signs (Most Recent):  Temp: 97.8 °F (36.6 °C) (10/26/19 1015)  Pulse: 84 (10/26/19 1015)  Resp: 16 (10/26/19 1015)  BP: (!) 156/90 (10/26/19 1016)  SpO2: 99 % (10/26/19 1015) Vital Signs (24h Range):  Temp:  [97.8 °F (36.6 °C)] 97.8 °F (36.6 °C)  Pulse:  [84] 84  Resp:  [16] 16  SpO2:  [99 %] 99 %  BP: (156)/(90) 156/90     Weight: 79.4 kg (175 lb)  Height: 5' 11" (180.3 cm)  Body mass index is 24.41 kg/m².    Ortho/SPM Exam   LLE:  Skin intact throughout, no scars present  Significant swelling around the knee and suprapatellar region  No ecchymosis  TTP at all aspects of the knee  No joint line tenderness   No tenderness to palpation of proximal, middle, or distal aspects of femur, tibia, or fibula  No tenderness to palpation of foot  ROM at knee limited due to pain      Flexion: 45 degrees      Extension: 10 degrees  Full painless ROM of hip and ankle  Compartments soft  SILT Sa/[place]illas/DP/SP/T  Motor intact EHL/FHL/TA/Gastroc  2+ DP  Brisk capillary refill     All other joints (shoulder/elbow/wrist/hip/knee/ankle) were examined and had full ROM and were non-tender to palpation.        Significant Labs: All pertinent labs within the past 24 hours have been reviewed.    Significant Imaging: I have reviewed all pertinent imaging results/findings.    Assessment/Plan:     * Leukopenia  Logan Massey is a 69 y.o. male with septic arthritis of the L knee   - NWB LLE  - LLE currently in hinged knee brace for comfort  - NPO for surgery  - hold abx until cultures taken in surgery    Surgical vs nonsurgical options discussed with patient and wife at length. They verbalize full understanding and wish to proceed with surgery at this time. He has been NPO since 4am. Surgery scheduled for noon. Patient marked, " booked, and consented.         Binu Hernandez MD  Orthopedics  [facility]

## 2019-10-26 NOTE — HPI
Logan Massey is a 69 y.o. male with PMH left knee arthritis, G6PD deficiency, and prostate cancer who presents with swelling and pain of the left knee. He states that he presented to his PCP 1 week ago and had his knee aspirated. He states that his pain and swelling came back a few days later. He presented to orthopedics clinic 2 days ago where the knee was aspirated again. Cultures from this aspiration are growing GPCs resembling staph. He denies any fevers, recent illnesses, or recent procedures/dental work. He presents to [place] for surgery today to treat his infection.

## 2019-10-26 NOTE — PROGRESS NOTES
Pharmacokinetic Initial Assessment: IV Vancomycin    Assessment/Plan:    Received 1000 mg in the OR at 1500 on 3/26, will initiate a maintenance dose of vancomycin 1000 mg IV every 12 hours.  Desired empiric serum trough concentration is 15 to 20 mcg/mL  Draw vancomycin trough level 30 min prior to fourth dose on 10/28 at approximately 0230  Pharmacy will continue to follow and monitor vancomycin.      Please contact pharmacy at extension [phone] with any questions regarding this assessment.     Thank you for the consult,   Alice Olguin       Patient brief summary:  Logan Massey is a 69 y.o. male initiated on antimicrobial therapy with IV Vancomycin for treatment of suspected bone/joint infection    Drug Allergies:   Review of patient's allergies indicates:  No Known Allergies    Actual Body Weight:   79.4 kg    Renal Function:   Estimated Creatinine Clearance: 94.1 mL/min (based on SCr of 0.8 mg/dL).,     Dialysis Method (if applicable):  N/A    CBC (last 72 hours):  Recent Labs   Lab Result Units 10/26/19  1210   WBC K/uL 6.78   Hemoglobin g/dL 11.7*   Hematocrit % 36.8*   Platelets K/uL 429*   Gran% % 64.9   Lymph% % 22.7   Mono% % 9.3   Eosinophil% % 2.2   Basophil% % 0.6   Differential Method  Automated       Metabolic Panel (last 72 hours):  Recent Labs   Lab Result Units 10/26/19  1210   Sodium mmol/L 139   Potassium mmol/L 4.5   Chloride mmol/L 104   CO2 mmol/L 26   Glucose mg/dL 86   BUN, Bld mg/dL 18   Creatinine mg/dL 0.8   Albumin g/dL 2.7*   Total Bilirubin mg/dL 0.9   Alkaline Phosphatase U/L 64   AST U/L 22   ALT U/L 51*       Drug levels (last 3 results):  No results for input(s): VANCOMYCINRA, VANCOMYCINPE, VANCOMYCINTR in the last 72 hours.    Microbiologic Results:  Microbiology Results (last 7 days)     Procedure Component Value Units Date/Time    Fungus culture [[id number]] Collected:  10/26/19 1511    Order Status:  Sent Specimen:  Joint Fluid from Knee, Left Updated:  10/26/19 1511     Aerobic culture [[id number]] Collected:  10/26/19 1511    Order Status:  Sent Specimen:  Joint Fluid from Knee, Left Updated:  10/26/19 1511    Gram stain [[id number]] Collected:  10/26/19 1511    Order Status:  Sent Specimen:  Joint Fluid from Knee, Left Updated:  10/26/19 1511    AFB Culture & Smear [[id number]] Collected:  10/26/19 1511    Order Status:  Sent Specimen:  Joint Fluid from Knee, Left Updated:  10/26/19 1511    Culture, Anaerobe [[id number]] Collected:  10/26/19 1511    Order Status:  Sent Specimen:  Joint Fluid from Knee, Left Updated:  10/26/19 1511    AFB Culture & Smear [[id number]] Collected:  10/26/19 1511    Order Status:  Sent Specimen:  Joint Fluid from Knee, Left Updated:  10/26/19 1511    Fungus culture [[id number]] Collected:  10/26/19 1511    Order Status:  Sent Specimen:  Joint Fluid from Knee, Left Updated:  10/26/19 1511    Gram stain [[id number]] Collected:  10/26/19 1511    Order Status:  Sent Specimen:  Joint Fluid from Knee, Left Updated:  10/26/19 1511    Culture, Anaerobe [[id number]] Collected:  10/26/19 1511    Order Status:  Sent Specimen:  Joint Fluid from Knee, Left Updated:  10/26/19 1511    Aerobic culture [[id number]] Collected:  10/26/19 1511    Order Status:  Sent Specimen:  Joint Fluid from Knee, Left Updated:  10/26/19 1511

## 2019-10-26 NOTE — BRIEF OP NOTE
[facility]  Brief Operative Note    SUMMARY     Surgery Date: 10/26/2019     Surgeon(s) and Role:     * MANDA Roger MD - Primary     * Shane Ha MD - Resident - Assisting     * Sharona Gomez MD - Resident - Assisting     * Gerald Montesinos MD - Resident - Assisting        Pre-op Diagnosis:  Pyogenic arthritis of left knee joint, due to unspecified organism [M00.9]    Post-op Diagnosis:  Post-Op Diagnosis Codes:     * Pyogenic arthritis of left knee joint, due to unspecified organism [M00.9]    Procedure(s) (LRB):  ARTHROSCOPY, KNEE, WITH DEBRIDEMENT (Left)    Anesthesia: General    Description of Procedure: see op note    Description of the findings of the procedure: pus    Estimated Blood Loss: *minimal         Specimens:   Specimen (12h ago, onward)    None

## 2019-10-26 NOTE — TELEPHONE ENCOUNTER
Called Dr. Pacheco to discuss prelim results of patient's knee culture.  Culture report is showing gram + cocci resembling staph.  Cell count and uric acid is still pending.  Dr. Pacheco said he will call patient to discuss culture results with him.

## 2019-10-26 NOTE — TRANSFER OF CARE
"Anesthesia Transfer of Care Note    Patient: Logan Massey    Procedure(s) Performed: Procedure(s) (LRB):  ARTHROSCOPY, KNEE, WITH DEBRIDEMENT (Left)    Patient location: PACU    Anesthesia Type: general    Transport from OR: Transported from OR on 6-10 L/min O2 by face mask with adequate spontaneous ventilation    Post pain: adequate analgesia    Post assessment: no apparent anesthetic complications and tolerated procedure well    Post vital signs: stable    Level of consciousness: awake and alert    Nausea/Vomiting: no nausea/vomiting    Complications: none    Transfer of care protocol was followed      Last vitals:   Visit Vitals  BP (!) 156/90   Pulse 84   Temp 36.6 °C (97.8 °F) (Oral)   Resp 16   Ht 5' 11" (1.803 m)   Wt 79.4 kg (175 lb)   SpO2 99%   BMI 24.41 kg/m²     "

## 2019-10-26 NOTE — ANESTHESIA PREPROCEDURE EVALUATION
10/26/2019  Logan Massey is a 69 y.o., male.    Anesthesia Evaluation    I have reviewed the Patient Summary Reports.    I have reviewed the Nursing Notes.      Review of Systems  Anesthesia Hx:  No problems with previous Anesthesia   Denies Personal Hx of Anesthesia complications.   Hematology/Oncology:     Oncology Normal    -- Anemia: Hematology Comments: Leukopenia  Macrocytosis  Glucose-6-phosphate dehydrogenase (G-6-PD) deficiency anemia      Oncology Comments: Prostate cancer     EENT/Dental:EENT/Dental Normal   Cardiovascular:  Cardiovascular Normal     Pulmonary:  Pulmonary Normal    Renal/:  Renal/ Normal     Hepatic/GI:  Hepatic/GI Normal    Musculoskeletal:  Musculoskeletal Normal Arthritis      Neurological:  Neurology Normal    Endocrine:  Endocrine Normal    Dermatological:  Skin Normal    Psych:  Psychiatric Normal           Physical Exam   Airway/Jaw/Neck:  Airway Findings:      Eyes/Ears/Nose:  EYES/EARS/NOSE FINDINGS: Normal   Dental:  Dental Findings:   Chest/Lungs:  Chest/Lungs Clear    Heart/Vascular:  Heart Findings: Normal Heart murmur: negative Vascular Findings: Normal    Abdomen:  Abdomen Findings: Normal    Musculoskeletal:  Musculoskeletal Findings: Normal   Skin:  Skin Findings: Normal    Mental Status:  Mental Status Findings: Normal        Anesthesia Plan  Type of Anesthesia, risks & benefits discussed:  Anesthesia Type:  general  Patient's Preference:   Intra-op Monitoring Plan: standard ASA monitors  Intra-op Monitoring Plan Comments:   Post Op Pain Control Plan: per primary service following discharge from PACU and IV/PO Opioids PRN  Post Op Pain Control Plan Comments:   Induction:   IV  Beta Blocker:  Patient is not currently on a Beta-Blocker (No further documentation required).       Informed Consent: Patient understands risks and agrees with Anesthesia plan.   Questions answered. Anesthesia consent signed with patient.  ASA Score: 3     Day of Surgery Review of History & Physical:    H&P update referred to the surgeon.         Ready For Surgery From Anesthesia Perspective.

## 2019-10-26 NOTE — PROGRESS NOTES
Patient awake and alert. Wife at bedside. Voice no complaints. No s/s of distress noted. Will continue to monitor patient.

## 2019-10-26 NOTE — ASSESSMENT & PLAN NOTE
69 y.o. male POD1 s/p Left knee I and D 10/26/19    Pain control: multimodal  PT/OT: WBAT LLE in HKB 0-90  DVT PPx: ambulate, FCDs at all times when not ambulating  Abx: postop vanc and rocephin, awaiting ID recs. Growing Staph lugodensis  Labs: Hgb stable, WBC 7  Drain: minimal output    Garay: none    Dispo: f/u PT recs, awaiting ID recs. vanc and rocephin for now.

## 2019-10-27 LAB
ALBUMIN SERPL BCP-MCNC: 2.5 G/DL (ref 3.5–5.2)
ALP SERPL-CCNC: 59 U/L (ref 55–135)
ALT SERPL W/O P-5'-P-CCNC: 36 U/L (ref 10–44)
ANION GAP SERPL CALC-SCNC: 6 MMOL/L (ref 8–16)
AST SERPL-CCNC: 19 U/L (ref 10–40)
BACTERIA FLD CULT: ABNORMAL
BASOPHILS # BLD AUTO: 0.05 K/UL (ref 0–0.2)
BASOPHILS NFR BLD: 0.7 % (ref 0–1.9)
BILIRUB SERPL-MCNC: 0.6 MG/DL (ref 0.1–1)
BUN SERPL-MCNC: 16 MG/DL (ref 8–23)
CALCIUM SERPL-MCNC: 8.8 MG/DL (ref 8.7–10.5)
CHLORIDE SERPL-SCNC: 103 MMOL/L (ref 95–110)
CO2 SERPL-SCNC: 27 MMOL/L (ref 23–29)
CREAT SERPL-MCNC: 0.9 MG/DL (ref 0.5–1.4)
DIFFERENTIAL METHOD: ABNORMAL
EOSINOPHIL # BLD AUTO: 0.1 K/UL (ref 0–0.5)
EOSINOPHIL NFR BLD: 1.5 % (ref 0–8)
ERYTHROCYTE [DISTWIDTH] IN BLOOD BY AUTOMATED COUNT: 12.9 % (ref 11.5–14.5)
EST. GFR  (AFRICAN AMERICAN): >60 ML/MIN/1.73 M^2
EST. GFR  (NON AFRICAN AMERICAN): >60 ML/MIN/1.73 M^2
GLUCOSE SERPL-MCNC: 106 MG/DL (ref 70–110)
GRAM STN SPEC: NORMAL
HCT VFR BLD AUTO: 34.5 % (ref 40–54)
HGB BLD-MCNC: 10.8 G/DL (ref 14–18)
IMM GRANULOCYTES # BLD AUTO: 0.02 K/UL (ref 0–0.04)
IMM GRANULOCYTES NFR BLD AUTO: 0.3 % (ref 0–0.5)
LYMPHOCYTES # BLD AUTO: 1.4 K/UL (ref 1–4.8)
LYMPHOCYTES NFR BLD: 19.1 % (ref 18–48)
MCH RBC QN AUTO: 30.9 PG (ref 27–31)
MCHC RBC AUTO-ENTMCNC: 31.3 G/DL (ref 32–36)
MCV RBC AUTO: 99 FL (ref 82–98)
MONOCYTES # BLD AUTO: 0.7 K/UL (ref 0.3–1)
MONOCYTES NFR BLD: 8.8 % (ref 4–15)
NEUTROPHILS # BLD AUTO: 5.2 K/UL (ref 1.8–7.7)
NEUTROPHILS NFR BLD: 69.6 % (ref 38–73)
NRBC BLD-RTO: 0 /100 WBC
PLATELET # BLD AUTO: 438 K/UL (ref 150–350)
PMV BLD AUTO: 9.5 FL (ref 9.2–12.9)
POTASSIUM SERPL-SCNC: 4.5 MMOL/L (ref 3.5–5.1)
PROT SERPL-MCNC: 6.2 G/DL (ref 6–8.4)
RBC # BLD AUTO: 3.49 M/UL (ref 4.6–6.2)
SODIUM SERPL-SCNC: 136 MMOL/L (ref 136–145)
WBC # BLD AUTO: 7.47 K/UL (ref 3.9–12.7)

## 2019-10-27 PROCEDURE — 97161 PT EVAL LOW COMPLEX 20 MIN: CPT

## 2019-10-27 PROCEDURE — 85025 COMPLETE CBC W/AUTO DIFF WBC: CPT

## 2019-10-27 PROCEDURE — 99223 PR INITIAL HOSPITAL CARE,LEVL III: ICD-10-PCS | Mod: ,,, | Performed by: INTERNAL MEDICINE

## 2019-10-27 PROCEDURE — 63600175 PHARM REV CODE 636 W HCPCS: Performed by: STUDENT IN AN ORGANIZED HEALTH CARE EDUCATION/TRAINING PROGRAM

## 2019-10-27 PROCEDURE — 25000003 PHARM REV CODE 250: Performed by: STUDENT IN AN ORGANIZED HEALTH CARE EDUCATION/TRAINING PROGRAM

## 2019-10-27 PROCEDURE — 94761 N-INVAS EAR/PLS OXIMETRY MLT: CPT

## 2019-10-27 PROCEDURE — 11000001 HC ACUTE MED/SURG PRIVATE ROOM

## 2019-10-27 PROCEDURE — 97165 OT EVAL LOW COMPLEX 30 MIN: CPT

## 2019-10-27 PROCEDURE — 97530 THERAPEUTIC ACTIVITIES: CPT

## 2019-10-27 PROCEDURE — 99499 UNLISTED E&M SERVICE: CPT | Mod: ,,, | Performed by: PHYSICIAN ASSISTANT

## 2019-10-27 PROCEDURE — 99499 NO LOS: ICD-10-PCS | Mod: ,,, | Performed by: PHYSICIAN ASSISTANT

## 2019-10-27 PROCEDURE — 80053 COMPREHEN METABOLIC PANEL: CPT

## 2019-10-27 PROCEDURE — 36415 COLL VENOUS BLD VENIPUNCTURE: CPT

## 2019-10-27 PROCEDURE — 97116 GAIT TRAINING THERAPY: CPT

## 2019-10-27 PROCEDURE — 99223 1ST HOSP IP/OBS HIGH 75: CPT | Mod: ,,, | Performed by: INTERNAL MEDICINE

## 2019-10-27 RX ADMIN — OXYCODONE HYDROCHLORIDE 5 MG: 5 TABLET ORAL at 02:10

## 2019-10-27 RX ADMIN — OXYCODONE HYDROCHLORIDE 10 MG: 10 TABLET ORAL at 05:10

## 2019-10-27 RX ADMIN — VANCOMYCIN HYDROCHLORIDE 1000 MG: 1 INJECTION, POWDER, FOR SOLUTION INTRAVENOUS at 02:10

## 2019-10-27 RX ADMIN — OXYCODONE HYDROCHLORIDE 5 MG: 5 TABLET ORAL at 08:10

## 2019-10-27 RX ADMIN — NAPROXEN 500 MG: 500 TABLET ORAL at 06:10

## 2019-10-27 RX ADMIN — VANCOMYCIN HYDROCHLORIDE 1000 MG: 1 INJECTION, POWDER, FOR SOLUTION INTRAVENOUS at 05:10

## 2019-10-27 RX ADMIN — DOCUSATE SODIUM 100 MG: 100 CAPSULE, LIQUID FILLED ORAL at 10:10

## 2019-10-27 NOTE — ASSESSMENT & PLAN NOTE
Pt is a 69 y.o. male with PMH left knee arthritis, G6PD deficiency, and prostate cancer who presents with swelling and pain of the left knee. Pt with knee pain over the past two months with recurrent swelling after aspirations.   He presented to orthopedics clinic 2 days ago where the knee was aspirated again. Aspiration showed-7,420 WBCs 96% segs.  Cultures from this aspiration are growing Staph Lugdunensis (susceptibility pending). He denies any fevers, recent illnesses, or recent procedures/dental work. He presents to [facility] for surgery today to treat his infection. Pt s/p l knee I and D on 10/26    Plan  -Continue Vancomycin  -Discontinue Ceftriaxone  -Recommend TTE as staph lugdunensis concerning for cause of endocarditis  -Will follow cxs and tailor abx accordingl  -Anticipate pt will need 6 weeks of IV abx from washout  -ID will continue to follow

## 2019-10-27 NOTE — PROGRESS NOTES
"[facility]  Orthopedics  Progress Note    Patient Name: Logan Massey  MRN: [id number]  Admission Date: 10/26/2019  Hospital Length of Stay: 1 days  Attending Provider: MANDA Roger MD  Primary Care Provider: Silvestre Celeste DO  Follow-up For: Procedure(s) (LRB):  ARTHROSCOPY, KNEE, WITH DEBRIDEMENT (Left)    Post-Operative Day: 1 Day Post-Op  Subjective:     Principal Problem:Septic arthritis of knee, left    Principal Orthopedic Problem: same    Interval History: Patient seen and examined at bedside.  No acute events overnight.  Pain controlled.  Growing Staph lugodensis from aspiration 3 days ago      Review of patient's allergies indicates:  No Known Allergies    Current Facility-Administered Medications   Medication    0.9%  NaCl infusion    0.9%  NaCl infusion    0.9%  NaCl infusion    acetaminophen tablet 650 mg    acetaminophen tablet 650 mg    cefTRIAXone (ROCEPHIN) 2 g in dextrose 5 % 50 mL IVPB    docusate sodium capsule 100 mg    lidocaine (PF) 10 mg/ml (1%) injection 10 mg    naproxen tablet 500 mg    ondansetron disintegrating tablet 8 mg    ondansetron injection 4 mg    oxyCODONE immediate release tablet 10 mg    oxyCODONE immediate release tablet 5 mg    ramelteon tablet 8 mg    sodium chloride 0.9% flush 10 mL    vancomycin in dextrose 5 % 1 gram/250 mL IVPB 1,000 mg     Objective:     Vital Signs (Most Recent):  Temp: 97.1 °F (36.2 °C) (10/27/19 0515)  Pulse: 80 (10/27/19 0515)  Resp: 18 (10/27/19 0515)  BP: 129/72 (10/27/19 0515)  SpO2: 97 % (10/27/19 0515) Vital Signs (24h Range):  Temp:  [97.1 °F (36.2 °C)-99.2 °F (37.3 °C)] 97.1 °F (36.2 °C)  Pulse:  [72-92] 80  Resp:  [13-20] 18  SpO2:  [95 %-100 %] 97 %  BP: (129-186)/() 129/72     Weight: 79.4 kg (175 lb)  Height: 5' 11" (180.3 cm)  Body mass index is 24.41 kg/m².      Intake/Output Summary (Last 24 hours) at 10/27/2019 0721  Last data filed at 10/26/2019 1704  Gross per 24 hour   Intake 1200 ml "   Output 675 ml   Net 525 ml       Ortho/SPM Exam  AAOx4  NAD  Reg rate  No increased WOB  Dressing c/d/i  Drain in place with bloody fluid  Polar ice in place  SILT T/SP/DP/[place]/Sa  Motor intact T/SP/DP  WWP extremities  FCDs in place and functioning    Significant Labs:   CBC:   Recent Labs   Lab 10/26/19  1210 10/27/19  0451   WBC 6.78 7.47   HGB 11.7* 10.8*   HCT 36.8* 34.5*   * 438*     CMP:   Recent Labs   Lab 10/26/19  1210 10/27/19  0451    136   K 4.5 4.5    103   CO2 26 27   GLU 86 106   BUN 18 16   CREATININE 0.8 0.9   CALCIUM 9.3 8.8   PROT 6.8 6.2   ALBUMIN 2.7* 2.5*   BILITOT 0.9 0.6   ALKPHOS 64 59   AST 22 19   ALT 51* 36   ANIONGAP 9 6*   EGFRNONAA >60.0 >60.0     CRP:   Recent Labs   Lab 10/26/19  1210   .8*     All pertinent labs within the past 24 hours have been reviewed.    Significant Imaging: I have reviewed all pertinent imaging results/findings.    Assessment/Plan:     Leukopenia  69 y.o. male POD1 s/p Left knee I and D 10/26/19    Pain control: multimodal  PT/OT: WBAT LLE in HKB 0-90  DVT PPx: ambulate, FCDs at all times when not ambulating  Abx: postop vanc and rocephin, awaiting ID recs. Growing Staph lugodensis  Labs: Hgb stable, WBC 7  Drain: minimal output    Garay: none    Dispo: f/u PT recs, awaiting ID recs. vanc and rocephin for now.      Elevated BP without diagnosis of hypertension  moniotr BP          Gerald Montesinos MD  Orthopedics  [facility]

## 2019-10-27 NOTE — PLAN OF CARE
Problem: Physical Therapy Goal  Goal: Physical Therapy Goal  Description  Goals to be met by: 11/10/2019    Patient will increase functional independence with mobility by performin. Supine to sit with Set-up [place]  2. Sit to supine with Set-up [place]  3. Sit to stand transfer with Supervision  4. Gait  x 150 feet with Supervision using Rolling Walker.   5. Ascend/descend 3 stair without Handrail(s) and Contact Guard Assistance using Rolling Walker.   6. Lower extremity exercise program x30 reps per handout, with independence   Outcome: Ongoing, Progressing     PT evaluation completed, goals established and plan of care reviewed with patient.      Zafar William, PT, DPT  10/27/2019  Pager #: [phone]

## 2019-10-27 NOTE — SUBJECTIVE & OBJECTIVE
Past Medical History:   Diagnosis Date    Arthritis     Prostate cancer        Past Surgical History:   Procedure Laterality Date    HERNIA REPAIR Right     At 5 years- rt groin , Left side - 2014       Review of patient's allergies indicates:  No Known Allergies    Medications:  Medications Prior to Admission   Medication Sig    [DISCONTINUED] naproxen (NAPROSYN) 500 MG tablet Take 1 tablet (500 mg total) by mouth 2 (two) times daily with meals.    cholecalciferol, vitamin D3, (VITAMIN D3) 1,000 unit Chew Take 1,000 Units by mouth 2 (two) times daily.    COLLAGEN MISC 4 capsules by Misc.(Non-Drug; Combo Route) route once daily.    cyanocobalamin (VITAMIN B-12) 1000 MCG tablet Take 1,000 mcg by mouth 2 (two) times daily.    Lactobacillus acidophilus (PROBIOTIC ORAL) Take 1 tablet by mouth once daily.    [DISCONTINUED] docusate sodium (COLACE) 100 MG capsule Take 100 mg by mouth once daily.    [DISCONTINUED] UNABLE TO FIND Take 2 capsules by mouth 2 (two) times daily. medication name:Steel Libido    [DISCONTINUED] UNABLE TO FIND Take 2 tablets by mouth 2 (two) times daily. medication name: rieshi mushroom    [DISCONTINUED] UNABLE TO FIND Take 6 capsules by mouth 2 (two) times daily. medication name: prostacaid    [DISCONTINUED] UNABLE TO FIND medication name:pectasol-C  1 scoop three times a day in water     Antibiotics (From admission, onward)    Start     Stop Route Frequency Ordered    10/27/19 0300  vancomycin in dextrose 5 % 1 gram/250 mL IVPB 1,000 mg  (vancomycin IVPB)      -- IV Every 12 hours (non-standard times) 10/26/19 1548    10/26/19 1700  cefTRIAXone (ROCEPHIN) 2 g in dextrose 5 % 50 mL IVPB      -- IV Every 24 hours (non-standard times) 10/26/19 1548        Antifungals (From admission, onward)    None        Antivirals (From admission, onward)    None             There is no immunization history on file for this patient.    Family History     Problem Relation (Age of Onset)    Heart  disease Father, Brother    Hypertension Father, Mother, Sister        Social History     Socioeconomic History    Marital status:      Spouse name: Not on file    Number of children: Not on file    Years of education: Not on file    Highest education level: Not on file   Occupational History    Not on file   Social Needs    Financial resource strain: Not on file    Food insecurity:     Worry: Not on file     Inability: Not on file    Transportation needs:     Medical: Not on file     Non-medical: Not on file   Tobacco Use    Smoking status: Never Smoker    Smokeless tobacco: Never Used   Substance and Sexual Activity    Alcohol use: No    Drug use: Not Currently     Comment: no IVDU    Sexual activity: Not on file   Lifestyle    Physical activity:     Days per week: Not on file     Minutes per session: Not on file    Stress: Not on file   Relationships    Social connections:     Talks on phone: Not on file     Gets together: Not on file     Attends Restorationism service: Not on file     Active member of club or organization: Not on file     Attends meetings of clubs or organizations: Not on file     Relationship status: Not on file   Other Topics Concern    Not on file   Social History Narrative    Not on file     Review of Systems   Constitutional: Negative for activity change, appetite change, chills and fever.   HENT: Negative for congestion, ear pain and facial swelling.    Respiratory: Negative for cough and shortness of breath.    Cardiovascular: Negative for chest pain and palpitations.   Gastrointestinal: Negative for abdominal distention, abdominal pain, constipation, diarrhea, nausea and vomiting.   Genitourinary: Negative for difficulty urinating, dysuria and flank pain.   Musculoskeletal: Positive for joint swelling. Negative for arthralgias and back pain.   Skin: Positive for wound. Negative for color change and pallor.   Psychiatric/Behavioral: Negative for agitation.      Objective:     Vital Signs (Most Recent):  Temp: 97.4 °F (36.3 °C) (10/27/19 1121)  Pulse: 82 (10/27/19 1121)  Resp: 18 (10/27/19 1121)  BP: (!) 142/80 (10/27/19 1121)  SpO2: 97 % (10/27/19 1121) Vital Signs (24h Range):  Temp:  [96.8 °F (36 °C)-99.2 °F (37.3 °C)] 97.4 °F (36.3 °C)  Pulse:  [72-92] 82  Resp:  [13-20] 18  SpO2:  [95 %-100 %] 97 %  BP: (129-186)/() 142/80     Weight: 79.4 kg (175 lb)  Body mass index is 24.41 kg/m².    Estimated Creatinine Clearance: 83.7 mL/min (based on SCr of 0.9 mg/dL).    Physical Exam   Constitutional: He is oriented to person, place, and time. He appears well-developed and well-nourished. No distress.   Neck: Normal range of motion.   Cardiovascular: Normal rate and regular rhythm.   No murmur heard.  Pulmonary/Chest: Breath sounds normal. No respiratory distress. He has no wheezes. He has no rales.   Abdominal: Soft. Bowel sounds are normal. He exhibits no distension. There is no tenderness.   Musculoskeletal: Normal range of motion. He exhibits no edema, tenderness or deformity.   Left leg wrapped with brace   Neurological: He is alert and oriented to person, place, and time.   Skin: Skin is warm and dry. He is not diaphoretic. No erythema.       Significant Labs: All pertinent labs within the past 24 hours have been reviewed.    Significant Imaging: I have reviewed all pertinent imaging results/findings within the past 24 hours.

## 2019-10-27 NOTE — PLAN OF CARE
Evaluation complete and goals set.  Cont with POC  Dianne Marie OT  10/27/2019    Problem: Occupational Therapy Goal  Goal: Occupational Therapy Goal  Description  Goals to be met by: 11/17/2019     Patient will increase functional independence with ADLs by performing:    Feeding with Modified [place].  UE Dressing with Modified [place].  LE Dressing with Set-up Assistance.  Grooming while standing with Supervision.  Toileting from toilet with Supervision for hygiene and clothing management.   Bathing from  sitting at sink with Supervision.  Supine to sit with Modified [place].  Step transfer with Supervision  Toilet transfer to toilet with Supervision.     Outcome: Ongoing, Progressing

## 2019-10-27 NOTE — NURSING TRANSFER
Nursing Transfer Note      10/26/2019     Transfer To: [address]    Transfer via stretcher    Transfer with SL, hemovac    Transported by rn    Medicines sent: no    Chart send with patient: Yes    Notified: spouse    Patient reassessed at: 10/26/19@1900hrs

## 2019-10-27 NOTE — CONSULTS
[facility]  Infectious Disease  Consult Note    Patient Name: Logan Massey  MRN: [id number]  Admission Date: 10/26/2019  Hospital Length of Stay: 1 days  Attending Physician: MANDA Roger MD  Primary Care Provider: Silvestre Celeste DO     Isolation Status: No active isolations    Inpatient consult to Infectious Diseases  Consult performed by: Brandon Motley PA-C  Consult ordered by: Gerald Montesinos MD      ID consult received. Chart being reviewed. Full consult note with recommendations to follow.      Thank you,  Brandon Motley PA-C  [phone]

## 2019-10-27 NOTE — HPI
Pt is a 69 y.o. male with PMH left knee arthritis, G6PD deficiency, and prostate cancer who presents with swelling and pain of the left knee. Pt with knee pain over the past two months with recurrent swelling after aspirations.   He presented to orthopedics clinic 2 days ago where the knee was aspirated again. Aspiration showed-7,420 WBCs 96% segs.  Cultures from this aspiration are growing Staph Lugdunensis. He denies any fevers, recent illnesses, or recent procedures/dental work. He presents to [facility] for surgery today to treat his infection. Pt s/p l knee I and D on 10/26

## 2019-10-27 NOTE — PT/OT/SLP EVAL
Physical Therapy Evaluation    Patient Name:  Logan Massey   MRN:  [id number]    Recommendations:     Discharge Recommendations:  home with home health   Discharge Equipment Recommendations: bedside commode, walker, rolling   Barriers to discharge: None    Assessment:       Logan Massey is a 69 y.o. male admitted with a medical diagnosis of Septic arthritis of knee, left and pertinent PMHx and surgical history including Arthroscopy/debridement 10/26/19.  He presents with the following impairments/functional limitations:  weakness, impaired functional mobilty, impaired endurance, gait instability, pain, impaired joint extensibility, impaired balance, impaired sensation, decreased lower extremity function, decreased ROM, orthopedic precautions, impaired muscle length, impaired self care skills.      Patient demonstrates good motivation and good activity tolerance secondary to appropriate pain control.  Patient requires contact guard assistance for bed mobility, transfers and ambulation secondary to weakness, pain and new sequencing for mobility.  Patient demonstrates good safety and tolerated ambulation of 100 ft with rolling walker, CGA and cues for sequencing and weight bearing.  Patient with some decreased tolerance of weight bearing secondary to pain but patient demonstrated good safety and sequencing once cues provided.  Patient will benefit from further skilled acute care and  PT for strengthening, sequencing and mobility training.      Rehab Prognosis: Good; patient would benefit from acute skilled PT services 6 x/week to address these deficits and reach maximum level of function.  Patient is most appropriate to go to home with home health .  Recent Surgery: Procedure(s) (LRB):  ARTHROSCOPY, KNEE, WITH DEBRIDEMENT (Left) 1 Day Post-Op    Plan:     During this hospitalization, patient to be seen 6 x/week to address the identified rehab impairments via gait training, therapeutic activities, therapeutic  "exercises, neuromuscular re-education and progress toward the following goals:    · Plan of Care Expires:  11/26/19    Subjective     Subjective:"It feels better than it did before the procedure."  Pain/Comfort:  · Pain Rating 1: 2/10  · Location - Side 1: Left  · Location 1: knee  · Pain Addressed 1: Pre-medicate for activity, Reposition, Cessation of Activity  · Pain Rating Post-Intervention 1: 2/10    Patients cultural, spiritual, Protestant conflicts given the current situation: no    Living Environment:  Patient lives with spouse in a single story home with 3 steps to enter without handrails.  Patient has a tub/shower and demonstrates independence with all ADLs and ambulation at baseline.  Patient has been with difficulty recently secondary to pain and was using a modified shower chair and assistance from wife when needed.  Equipment available at home: crutches, rollator, shower chair.   Upon discharge, patient will have assistance from spouse.  Objective:     Communicated with RN prior to session.  Patient found up on bedside commode with cryotherapy, FCD, peripheral IV  upon PT entry to room.    General Precautions: Standard, fall   Orthopedic Precautions:RLE weight bearing as tolerated(0-90 degrees in hinged knee brace)   Braces: Hinged knee brace     Exams:  · RLE ROM: WFL  · RLE Strength: WFL  · LLE ROM: Deficits: ~ 10-65 degrees  · LLE Strength: Deficits: 4-/5 hip flex    Functional Mobility:  · Transfers:     · Sit to Stand:  contact guard assistance with rolling walker  · Gait: Patient ambulated 100 ft with rolling walker and contact guard assistance.    Therapeutic Activities and Exercises:   Therapeutic Exercises: Patient performed 1 set(s) of 10 repetitions of  quad sets and heel slides for left LE.  Gait training:  Patient required cues for sequencing, upright posture and L knee extension in stance phase to increase independence and safety.  Patient required cues ~ 25% of the time.  Patient with " decreased weight bearing on L knee secondary to pain.    Patient Education:    Patient educated on Fall risk, gait training, home safety, Home exercise program and transfer training by explanation.  Patient was receptive to education and verbalizes understanding.     AM-PAC 6 CLICK MOBILITY  Total Score:18     Patient left up in chair with all lines intact and call button in reach.    GOALS:   Multidisciplinary Problems     Physical Therapy Goals        Problem: Physical Therapy Goal    Goal Priority Disciplines Outcome Goal Variances Interventions   Physical Therapy Goal     PT, PT/OT Ongoing, Progressing     Description:  Goals to be met by: 11/10/2019    Patient will increase functional independence with mobility by performin. Supine to sit with Set-up [place]  2. Sit to supine with Set-up [place]  3. Sit to stand transfer with Supervision  4. Gait  x 150 feet with Supervision using Rolling Walker.   5. Ascend/descend 3 stair without Handrail(s) and Contact Guard Assistance using Rolling Walker.   6. Lower extremity exercise program x30 reps per handout, with independence                    History:     Past Medical History:   Diagnosis Date    Arthritis     Prostate cancer        Past Surgical History:   Procedure Laterality Date    HERNIA REPAIR Right     At 5 years- rt groin , Left side -        Time Tracking:     PT Received On: 10/27/19  PT Start Time: 1050     PT Stop Time: 1121  PT Total Time (min): 31 min     Billable Minutes: Evaluation 10 min Gait Training 16 min      Zafar William, PT  10/27/2019

## 2019-10-27 NOTE — CONSULTS
[facility]  Infectious Disease  Consult Note    Patient Name: Logan Massey  MRN: [id number]  Admission Date: 10/26/2019  Hospital Length of Stay: 1 days  Attending Physician: MANDA Roger MD  Primary Care Provider: Silvestre Celeste DO     Isolation Status: No active isolations    Patient information was obtained from patient, past medical records and ER records.      Consults  Assessment/Plan:     * Septic arthritis of knee, left  Pt is a 69 y.o. male with PMH left knee arthritis, G6PD deficiency, and prostate cancer who presents with swelling and pain of the left knee. Pt with knee pain over the past two months with recurrent swelling after aspirations.   He presented to orthopedics clinic 2 days ago where the knee was aspirated again. Aspiration showed-7,420 WBCs 96% segs.  Cultures from this aspiration are growing Staph Lugdunensis (susceptibility pending). He denies any fevers, recent illnesses, or recent procedures/dental work. He presents to [facility] for surgery today to treat his infection. Pt s/p l knee I and D on 10/26    Plan  -Continue Vancomycin  -Discontinue Ceftriaxone  -Recommend TTE as staph lugdunensis concerning for cause of endocarditis  -Will follow cxs and tailor abx accordingl  -Anticipate pt will need 6 weeks of IV abx from washout  -ID will continue to follow        Thank you for your consult. I will follow-up with patient. Please contact us if you have any additional questions.    Brandon Motley PA-C  Infectious Disease  [facility]    Subjective:     Principal Problem: Septic arthritis of knee, left    HPI: Pt is a 69 y.o. male with PMH left knee arthritis, G6PD deficiency, and prostate cancer who presents with swelling and pain of the left knee. Pt with knee pain over the past two months with recurrent swelling after aspirations.   He presented to orthopedics clinic 2 days ago where the knee was aspirated again. Aspiration showed-7,420 WBCs  96% segs.  Cultures from this aspiration are growing Staph Lugdunensis. He denies any fevers, recent illnesses, or recent procedures/dental work. He presents to [facility] for surgery today to treat his infection.  Pt s/p l knee I and D on 10/26    Past Medical History:   Diagnosis Date    Arthritis     Prostate cancer        Past Surgical History:   Procedure Laterality Date    HERNIA REPAIR Right     At 5 years- rt groin , Left side - 2014       Review of patient's allergies indicates:  No Known Allergies    Medications:  Medications Prior to Admission   Medication Sig    [DISCONTINUED] naproxen (NAPROSYN) 500 MG tablet Take 1 tablet (500 mg total) by mouth 2 (two) times daily with meals.    cholecalciferol, vitamin D3, (VITAMIN D3) 1,000 unit Chew Take 1,000 Units by mouth 2 (two) times daily.    COLLAGEN MISC 4 capsules by Misc.(Non-Drug; Combo Route) route once daily.    cyanocobalamin (VITAMIN B-12) 1000 MCG tablet Take 1,000 mcg by mouth 2 (two) times daily.    Lactobacillus acidophilus (PROBIOTIC ORAL) Take 1 tablet by mouth once daily.    [DISCONTINUED] docusate sodium (COLACE) 100 MG capsule Take 100 mg by mouth once daily.    [DISCONTINUED] UNABLE TO FIND Take 2 capsules by mouth 2 (two) times daily. medication name:Steel Libido    [DISCONTINUED] UNABLE TO FIND Take 2 tablets by mouth 2 (two) times daily. medication name: richristopher mushroom    [DISCONTINUED] UNABLE TO FIND Take 6 capsules by mouth 2 (two) times daily. medication name: prostacaid    [DISCONTINUED] UNABLE TO FIND medication name:pectasol-C  1 scoop three times a day in water     Antibiotics (From admission, onward)    Start     Stop Route Frequency Ordered    10/27/19 0300  vancomycin in dextrose 5 % 1 gram/250 mL IVPB 1,000 mg  (vancomycin IVPB)      -- IV Every 12 hours (non-standard times) 10/26/19 1548    10/26/19 1700  cefTRIAXone (ROCEPHIN) 2 g in dextrose 5 % 50 mL IVPB      -- IV Every 24 hours (non-standard times) 10/26/19 6854         Antifungals (From admission, onward)    None        Antivirals (From admission, onward)    None             There is no immunization history on file for this patient.    Family History     Problem Relation (Age of Onset)    Heart disease Father, Brother    Hypertension Father, Mother, Sister        Social History     Socioeconomic History    Marital status:      Spouse name: Not on file    Number of children: Not on file    Years of education: Not on file    Highest education level: Not on file   Occupational History    Not on file   Social Needs    Financial resource strain: Not on file    Food insecurity:     Worry: Not on file     Inability: Not on file    Transportation needs:     Medical: Not on file     Non-medical: Not on file   Tobacco Use    Smoking status: Never Smoker    Smokeless tobacco: Never Used   Substance and Sexual Activity    Alcohol use: No    Drug use: Not Currently     Comment: no IVDU    Sexual activity: Not on file   Lifestyle    Physical activity:     Days per week: Not on file     Minutes per session: Not on file    Stress: Not on file   Relationships    Social connections:     Talks on phone: Not on file     Gets together: Not on file     Attends Anabaptism service: Not on file     Active member of club or organization: Not on file     Attends meetings of clubs or organizations: Not on file     Relationship status: Not on file   Other Topics Concern    Not on file   Social History Narrative    Not on file     Review of Systems   Constitutional: Negative for activity change, appetite change, chills and fever.   HENT: Negative for congestion, ear pain and facial swelling.    Respiratory: Negative for cough and shortness of breath.    Cardiovascular: Negative for chest pain and palpitations.   Gastrointestinal: Negative for abdominal distention, abdominal pain, constipation, diarrhea, nausea and vomiting.   Genitourinary: Negative for difficulty urinating,  dysuria and flank pain.   Musculoskeletal: Positive for joint swelling. Negative for arthralgias and back pain.   Skin: Positive for wound. Negative for color change and pallor.   Psychiatric/Behavioral: Negative for agitation.     Objective:     Vital Signs (Most Recent):  Temp: 97.4 °F (36.3 °C) (10/27/19 1121)  Pulse: 82 (10/27/19 1121)  Resp: 18 (10/27/19 1121)  BP: (!) 142/80 (10/27/19 1121)  SpO2: 97 % (10/27/19 1121) Vital Signs (24h Range):  Temp:  [96.8 °F (36 °C)-99.2 °F (37.3 °C)] 97.4 °F (36.3 °C)  Pulse:  [72-92] 82  Resp:  [13-20] 18  SpO2:  [95 %-100 %] 97 %  BP: (129-186)/() 142/80     Weight: 79.4 kg (175 lb)  Body mass index is 24.41 kg/m².    Estimated Creatinine Clearance: 83.7 mL/min (based on SCr of 0.9 mg/dL).    Physical Exam   Constitutional: He is oriented to person, place, and time. He appears well-developed and well-nourished. No distress.   Neck: Normal range of motion.   Cardiovascular: Normal rate and regular rhythm.   No murmur heard.  Pulmonary/Chest: Breath sounds normal. No respiratory distress. He has no wheezes. He has no rales.   Abdominal: Soft. Bowel sounds are normal. He exhibits no distension. There is no tenderness.   Musculoskeletal: Normal range of motion. He exhibits no edema, tenderness or deformity.   Left leg wrapped with brace   Neurological: He is alert and oriented to person, place, and time.   Skin: Skin is warm and dry. He is not diaphoretic. No erythema.       Significant Labs: All pertinent labs within the past 24 hours have been reviewed.    Significant Imaging: I have reviewed all pertinent imaging results/findings within the past 24 hours.

## 2019-10-27 NOTE — PT/OT/SLP EVAL
Occupational Therapy   Evaluation    Name: Logan Massey  MRN: [id number]  Admitting Diagnosis:  Septic arthritis of knee, left 1 Day Post-Op    Recommendations:     Discharge Recommendations: home with home health  Discharge Equipment Recommendations:  bedside commode, walker, rolling  Barriers to discharge:  None    Assessment:     Logan Massey is a 69 y.o. male with a medical diagnosis of Septic arthritis of knee, left.  He presents sitting on bedside commode with nurse and wife present. Pt tolerated session well, requiring CGA with rolling walker for sit-to-stand transfers from bedside commode and toilet and for ambulation.  Home health OT services recommended at discharge for maximal patient gains in functional performance.  Performance deficits affecting function: weakness, impaired functional mobilty, impaired endurance, gait instability, pain, impaired joint extensibility, impaired balance, impaired sensation, decreased lower extremity function, decreased ROM, orthopedic precautions, impaired muscle length, impaired self care skills.      Rehab Prognosis: Good; patient would benefit from acute skilled OT services to address these deficits and reach maximum level of function.       Plan:     Patient to be seen 4 x/week to address the above listed problems via self-care/home management, therapeutic activities, therapeutic exercises  · Plan of Care Expires: 11/27/19  · Plan of Care Reviewed with: patient, spouse    Subjective     Chief Complaint: knee pain, decreased independence with ADLs and functional mobility  Patient/Family Comments/goals: to return home, PLOF    Occupational Profile:  Living Environment: Pt lives with his wife in [facility] with 3 FELIPE with no handrails.  Pt has a tub/shower combo with no grab bars with a shower chair his wife made.  Pt has a standard toilet with no grab bars.  Previous level of function: [place] with ADLs, drives   Roles and Routines: , , and imam,  enjoys traveling with his wife   Equipment Used at Home:  crutches, rollator, shower chair  Assistance upon Discharge: his wife    Pain/Comfort:  · Pain Rating 1: 2/10  · Location - Side 1: Left  · Location - Orientation 1: generalized  · Location 1: knee  · Pain Addressed 1: Pre-medicate for activity, Reposition, Cessation of Activity  · Pain Rating Post-Intervention 1: 2/10    Patients cultural, spiritual, Zoroastrianism conflicts given the current situation: no    Objective:     Communicated with: nurse and PT prior to session.  Patient found sitting on bedside commode with nurse and wife present with cryotherapy, peripheral IV, SCD upon OT entry to room.    General Precautions: Standard, fall   Orthopedic Precautions:RLE weight bearing as tolerated; 0-90 degrees in hinged knee brace   Braces: Hinged knee brace     Occupational Performance:    Functional Mobility/Transfers:  · Patient completed Sit <> Stand Transfer with contact guard assistance  with  rolling walker   · Patient completed Toilet Transfer Step Transfer technique with contact guard assistance with  rolling walker  · Functional Mobility: Pt ambulated from his room, down the shay, and back with CGA with RW, requiring no rest breaks.    Cognitive/Visual Perceptual:  Cognitive/Psychosocial Skills:     -       Oriented to: Person, Place, Time and Situation   -       Follows Commands/attention:Follows two-step commands  -       Communication: clear/fluent  -       Memory: No Deficits noted  -       Safety awareness/insight to disability: intact   -       Mood/Affect/Coping skills/emotional control: Appropriate to situation    Physical Exam:  Upper Extremity Range of Motion:     -       Right Upper Extremity: WFL  -       Left Upper Extremity: WFL  Upper Extremity Strength:    -       Right Upper Extremity: WFL  -       Left Upper Extremity: WFL   Strength:    -       Right Upper Extremity: WFL  -       Left Upper Extremity: WFL    [facility] 6 Click  ADL:  AMPAC Total Score: 19    Treatment & Education:  Pt edu on role of OT, plan of care, benefit of participation in out of bed activity, and  safety when performing functional transfers and self care tasks.  - White board updated  Education:    Patient left up in chair with all lines intact, call button in reach and wife present    GOALS:   Multidisciplinary Problems     Occupational Therapy Goals        Problem: Occupational Therapy Goal    Goal Priority Disciplines Outcome Interventions   Occupational Therapy Goal     OT, PT/OT     Description:  Goals to be met by: 11/17/2019     Patient will increase functional independence with ADLs by performing:    Feeding with Modified [place].  UE Dressing with Modified [place].  LE Dressing with Set-up Assistance.  Grooming while standing with Supervision.  Toileting from toilet with Supervision for hygiene and clothing management.   Bathing from  sitting at sink with Supervision.  Supine to sit with Modified [place].  Step transfer with Supervision  Toilet transfer to toilet with Supervision.                      History:     Past Medical History:   Diagnosis Date    Arthritis     Prostate cancer        Past Surgical History:   Procedure Laterality Date    HERNIA REPAIR Right     At 5 years- rt groin , Left side - 2014       Time Tracking:     OT Date of Treatment: 10/27/19  OT Start Time: 1049  OT Stop Time: 1118  OT Total Time (min): 29 min    Billable Minutes:Evaluation 10  Therapeutic Activity 19    DANNY Rogers  10/27/2019

## 2019-10-27 NOTE — SUBJECTIVE & OBJECTIVE
"Principal Problem:Septic arthritis of knee, left    Principal Orthopedic Problem: same    Interval History: Patient seen and examined at bedside.  No acute events overnight.  Pain controlled.  Growing Staph lugodensis from aspiration 3 days ago      Review of patient's allergies indicates:  No Known Allergies    Current Facility-Administered Medications   Medication    0.9%  NaCl infusion    0.9%  NaCl infusion    0.9%  NaCl infusion    acetaminophen tablet 650 mg    acetaminophen tablet 650 mg    cefTRIAXone (ROCEPHIN) 2 g in dextrose 5 % 50 mL IVPB    docusate sodium capsule 100 mg    lidocaine (PF) 10 mg/ml (1%) injection 10 mg    naproxen tablet 500 mg    ondansetron disintegrating tablet 8 mg    ondansetron injection 4 mg    oxyCODONE immediate release tablet 10 mg    oxyCODONE immediate release tablet 5 mg    ramelteon tablet 8 mg    sodium chloride 0.9% flush 10 mL    vancomycin in dextrose 5 % 1 gram/250 mL IVPB 1,000 mg     Objective:     Vital Signs (Most Recent):  Temp: 97.1 °F (36.2 °C) (10/27/19 0515)  Pulse: 80 (10/27/19 0515)  Resp: 18 (10/27/19 0515)  BP: 129/72 (10/27/19 0515)  SpO2: 97 % (10/27/19 0515) Vital Signs (24h Range):  Temp:  [97.1 °F (36.2 °C)-99.2 °F (37.3 °C)] 97.1 °F (36.2 °C)  Pulse:  [72-92] 80  Resp:  [13-20] 18  SpO2:  [95 %-100 %] 97 %  BP: (129-186)/() 129/72     Weight: 79.4 kg (175 lb)  Height: 5' 11" (180.3 cm)  Body mass index is 24.41 kg/m².      Intake/Output Summary (Last 24 hours) at 10/27/2019 0721  Last data filed at 10/26/2019 1704  Gross per 24 hour   Intake 1200 ml   Output 675 ml   Net 525 ml       Ortho/SPM Exam  AAOx4  NAD  Reg rate  No increased WOB  Dressing c/d/i  Drain in place with bloody fluid  Polar ice in place  SILT T/SP/DP/[place]/Sa  Motor intact T/SP/DP  WWP extremities  FCDs in place and functioning    Significant Labs:   CBC:   Recent Labs   Lab 10/26/19  1210 10/27/19  0451   WBC 6.78 7.47   HGB 11.7* 10.8*   HCT 36.8* 34.5*   PLT " 429* 438*     CMP:   Recent Labs   Lab 10/26/19  1210 10/27/19  0451    136   K 4.5 4.5    103   CO2 26 27   GLU 86 106   BUN 18 16   CREATININE 0.8 0.9   CALCIUM 9.3 8.8   PROT 6.8 6.2   ALBUMIN 2.7* 2.5*   BILITOT 0.9 0.6   ALKPHOS 64 59   AST 22 19   ALT 51* 36   ANIONGAP 9 6*   EGFRNONAA >60.0 >60.0     CRP:   Recent Labs   Lab 10/26/19  1210   .8*     All pertinent labs within the past 24 hours have been reviewed.    Significant Imaging: I have reviewed all pertinent imaging results/findings.

## 2019-10-28 LAB
ASCENDING AORTA: 3.15 CM
AV INDEX (PROSTH): 0.86
AV MEAN GRADIENT: 3 MMHG
AV PEAK GRADIENT: 7 MMHG
AV VALVE AREA: 2.6 CM2
AV VELOCITY RATIO: 0.79
BSA FOR ECHO PROCEDURE: 1.99 M2
CV ECHO LV RWT: 0.4 CM
DOP CALC AO PEAK VEL: 1.33 M/S
DOP CALC AO VTI: 22.73 CM
DOP CALC LVOT AREA: 3 CM2
DOP CALC LVOT DIAMETER: 1.96 CM
DOP CALC LVOT PEAK VEL: 1.05 M/S
DOP CALC LVOT STROKE VOLUME: 59.17 CM3
DOP CALCLVOT PEAK VEL VTI: 19.62 CM
E WAVE DECELERATION TIME: 233.65 MSEC
E/A RATIO: 0.87
E/E' RATIO: 7.06 M/S
ECHO LV POSTERIOR WALL: 0.94 CM (ref 0.6–1.1)
FRACTIONAL SHORTENING: 35 % (ref 28–44)
INTERVENTRICULAR SEPTUM: 0.89 CM (ref 0.6–1.1)
IVRT: 0.1 MSEC
LA MAJOR: 5 CM
LA MINOR: 4.98 CM
LA WIDTH: 4.15 CM
LEFT ATRIUM SIZE: 3.51 CM
LEFT ATRIUM VOLUME INDEX: 31 ML/M2
LEFT ATRIUM VOLUME: 61.78 CM3
LEFT INTERNAL DIMENSION IN SYSTOLE: 3.06 CM (ref 2.1–4)
LEFT VENTRICLE DIASTOLIC VOLUME INDEX: 50.84 ML/M2
LEFT VENTRICLE DIASTOLIC VOLUME: 101.31 ML
LEFT VENTRICLE MASS INDEX: 73 G/M2
LEFT VENTRICLE SYSTOLIC VOLUME INDEX: 18.4 ML/M2
LEFT VENTRICLE SYSTOLIC VOLUME: 36.72 ML
LEFT VENTRICULAR INTERNAL DIMENSION IN DIASTOLE: 4.68 CM (ref 3.5–6)
LEFT VENTRICULAR MASS: 144.86 G
LV LATERAL E/E' RATIO: 7.5 M/S
LV SEPTAL E/E' RATIO: 6.67 M/S
MV PEAK A VEL: 0.69 M/S
MV PEAK E VEL: 0.6 M/S
PISA TR MAX VEL: 2.18 M/S
PULM VEIN S/D RATIO: 2.23
PV PEAK D VEL: 0.3 M/S
PV PEAK S VEL: 0.67 M/S
RA MAJOR: 5.26 CM
RA PRESSURE: 3 MMHG
RA WIDTH: 3.99 CM
RIGHT VENTRICULAR END-DIASTOLIC DIMENSION: 3.19 CM
RV TISSUE DOPPLER FREE WALL SYSTOLIC VELOCITY 1 (APICAL 4 CHAMBER VIEW): 16.4 CM/S
SINUS: 3.37 CM
STJ: 2.69 CM
TDI LATERAL: 0.08 M/S
TDI SEPTAL: 0.09 M/S
TDI: 0.09 M/S
TR MAX PG: 19 MMHG
TRICUSPID ANNULAR PLANE SYSTOLIC EXCURSION: 2.07 CM
TV REST PULMONARY ARTERY PRESSURE: 22 MMHG
VANCOMYCIN TROUGH SERPL-MCNC: 8 UG/ML (ref 10–22)

## 2019-10-28 PROCEDURE — 36415 COLL VENOUS BLD VENIPUNCTURE: CPT

## 2019-10-28 PROCEDURE — 63600175 PHARM REV CODE 636 W HCPCS: Performed by: PHYSICIAN ASSISTANT

## 2019-10-28 PROCEDURE — 36410 VNPNXR 3YR/> PHY/QHP DX/THER: CPT

## 2019-10-28 PROCEDURE — 99233 PR SUBSEQUENT HOSPITAL CARE,LEVL III: ICD-10-PCS | Mod: ,,, | Performed by: PHYSICIAN ASSISTANT

## 2019-10-28 PROCEDURE — 11000001 HC ACUTE MED/SURG PRIVATE ROOM

## 2019-10-28 PROCEDURE — 97116 GAIT TRAINING THERAPY: CPT

## 2019-10-28 PROCEDURE — 94761 N-INVAS EAR/PLS OXIMETRY MLT: CPT

## 2019-10-28 PROCEDURE — C1751 CATH, INF, PER/CENT/MIDLINE: HCPCS

## 2019-10-28 PROCEDURE — 80202 ASSAY OF VANCOMYCIN: CPT

## 2019-10-28 PROCEDURE — 25000003 PHARM REV CODE 250: Performed by: STUDENT IN AN ORGANIZED HEALTH CARE EDUCATION/TRAINING PROGRAM

## 2019-10-28 PROCEDURE — 76937 US GUIDE VASCULAR ACCESS: CPT

## 2019-10-28 PROCEDURE — 63600175 PHARM REV CODE 636 W HCPCS: Performed by: STUDENT IN AN ORGANIZED HEALTH CARE EDUCATION/TRAINING PROGRAM

## 2019-10-28 PROCEDURE — 99233 SBSQ HOSP IP/OBS HIGH 50: CPT | Mod: ,,, | Performed by: PHYSICIAN ASSISTANT

## 2019-10-28 PROCEDURE — 25000003 PHARM REV CODE 250: Performed by: ORTHOPAEDIC SURGERY

## 2019-10-28 PROCEDURE — A4216 STERILE WATER/SALINE, 10 ML: HCPCS | Performed by: ORTHOPAEDIC SURGERY

## 2019-10-28 RX ORDER — SODIUM CHLORIDE 0.9 % (FLUSH) 0.9 %
10 SYRINGE (ML) INJECTION EVERY 6 HOURS
Status: DISCONTINUED | OUTPATIENT
Start: 2019-10-28 | End: 2019-10-30 | Stop reason: HOSPADM

## 2019-10-28 RX ORDER — SODIUM CHLORIDE 0.9 % (FLUSH) 0.9 %
10 SYRINGE (ML) INJECTION
Status: DISCONTINUED | OUTPATIENT
Start: 2019-10-28 | End: 2019-10-30 | Stop reason: HOSPADM

## 2019-10-28 RX ORDER — ENOXAPARIN SODIUM 100 MG/ML
40 INJECTION SUBCUTANEOUS EVERY 24 HOURS
Status: DISCONTINUED | OUTPATIENT
Start: 2019-10-28 | End: 2019-10-29

## 2019-10-28 RX ADMIN — VANCOMYCIN HYDROCHLORIDE 1000 MG: 1 INJECTION, POWDER, FOR SOLUTION INTRAVENOUS at 04:10

## 2019-10-28 RX ADMIN — OXYCODONE HYDROCHLORIDE 10 MG: 10 TABLET ORAL at 12:10

## 2019-10-28 RX ADMIN — OXYCODONE HYDROCHLORIDE 10 MG: 10 TABLET ORAL at 04:10

## 2019-10-28 RX ADMIN — NAPROXEN 500 MG: 500 TABLET ORAL at 12:10

## 2019-10-28 RX ADMIN — DOCUSATE SODIUM 100 MG: 100 CAPSULE, LIQUID FILLED ORAL at 10:10

## 2019-10-28 RX ADMIN — DEXTROSE 6 G: 5 SOLUTION INTRAVENOUS at 03:10

## 2019-10-28 RX ADMIN — OXYCODONE HYDROCHLORIDE 10 MG: 10 TABLET ORAL at 10:10

## 2019-10-28 RX ADMIN — NAPROXEN 500 MG: 500 TABLET ORAL at 07:10

## 2019-10-28 RX ADMIN — Medication 10 ML: at 12:10

## 2019-10-28 RX ADMIN — ENOXAPARIN SODIUM 40 MG: 100 INJECTION SUBCUTANEOUS at 05:10

## 2019-10-28 NOTE — SUBJECTIVE & OBJECTIVE
"Principal Problem:Septic arthritis of knee, left    Principal Orthopedic Problem: same    Interval History: Patient seen and examined at bedside.  No acute events overnight.  Pain controlled.  Growing StaphSTAPHYLOCOCCUS EBENIS from aspiration 4 days ago      Review of patient's allergies indicates:  No Known Allergies    Current Facility-Administered Medications   Medication    0.9%  NaCl infusion    0.9%  NaCl infusion    0.9%  NaCl infusion    acetaminophen tablet 650 mg    acetaminophen tablet 650 mg    docusate sodium capsule 100 mg    enoxaparin injection 40 mg    lidocaine (PF) 10 mg/ml (1%) injection 10 mg    naproxen tablet 500 mg    ondansetron disintegrating tablet 8 mg    ondansetron injection 4 mg    oxyCODONE immediate release tablet 10 mg    oxyCODONE immediate release tablet 5 mg    ramelteon tablet 8 mg    sodium chloride 0.9% flush 10 mL    vancomycin in dextrose 5 % 1 gram/250 mL IVPB 1,000 mg     Objective:     Vital Signs (Most Recent):  Temp: 98.1 °F (36.7 °C) (10/28/19 0420)  Pulse: 87 (10/28/19 0420)  Resp: 18 (10/28/19 0420)  BP: (!) 154/96 (10/28/19 0420)  SpO2: (!) 94 % (10/28/19 0420) Vital Signs (24h Range):  Temp:  [96.8 °F (36 °C)-99.5 °F (37.5 °C)] 98.1 °F (36.7 °C)  Pulse:  [77-98] 87  Resp:  [16-18] 18  SpO2:  [94 %-97 %] 94 %  BP: (140-158)/(77-96) 154/96     Weight: 79.4 kg (175 lb)  Height: 5' 11" (180.3 cm)  Body mass index is 24.41 kg/m².    No intake or output data in the 24 hours ending 10/28/19 0602    Ortho/SPM Exam    AAOx4  NAD  Reg rate  No increased WOB  Dressing c/d/i  Drain in place with bloody fluid  Polar ice in place  SILT T/SP/DP/[place]/Sa  Motor intact T/SP/DP  WWP extremities  FCDs in place and functioning    Significant Labs:   CBC:   Recent Labs   Lab 10/26/19  1210 10/27/19  0451   WBC 6.78 7.47   HGB 11.7* 10.8*   HCT 36.8* 34.5*   * 438*     CMP:   Recent Labs   Lab 10/26/19  1210 10/27/19  0451    136   K 4.5 4.5    103 "   CO2 26 27   GLU 86 106   BUN 18 16   CREATININE 0.8 0.9   CALCIUM 9.3 8.8   PROT 6.8 6.2   ALBUMIN 2.7* 2.5*   BILITOT 0.9 0.6   ALKPHOS 64 59   AST 22 19   ALT 51* 36   ANIONGAP 9 6*   EGFRNONAA >60.0 >60.0     CRP:   Recent Labs   Lab 10/26/19  1210   .8*     All pertinent labs within the past 24 hours have been reviewed.    Significant Imaging: I have reviewed all pertinent imaging results/findings.

## 2019-10-28 NOTE — PROGRESS NOTES
Therapy with Vancomycin  complete and/or consult discontinued by provider.  Pharmacy will sign off, please re-consult as needed.

## 2019-10-28 NOTE — PROGRESS NOTES
"[facility]  Orthopedics  Progress Note    Patient Name: Logan Massey  MRN: [id number]  Admission Date: 10/26/2019  Hospital Length of Stay: 2 days  Attending Provider: MANDA Roger MD  Primary Care Provider: Silvestre Celeste DO  Follow-up For: Procedure(s) (LRB):  ARTHROSCOPY, KNEE, WITH DEBRIDEMENT (Left)    Post-Operative Day: 2 Days Post-Op  Subjective:     Principal Problem:Septic arthritis of knee, left    Principal Orthopedic Problem: same    Interval History: Patient seen and examined at bedside.  No acute events overnight.  Pain controlled.  Growing StaphSTAPHYLOCOCCUS LUGDUNENSIS from aspiration 4 days ago      Review of patient's allergies indicates:  No Known Allergies    Current Facility-Administered Medications   Medication    0.9%  NaCl infusion    0.9%  NaCl infusion    0.9%  NaCl infusion    acetaminophen tablet 650 mg    acetaminophen tablet 650 mg    docusate sodium capsule 100 mg    enoxaparin injection 40 mg    lidocaine (PF) 10 mg/ml (1%) injection 10 mg    naproxen tablet 500 mg    ondansetron disintegrating tablet 8 mg    ondansetron injection 4 mg    oxyCODONE immediate release tablet 10 mg    oxyCODONE immediate release tablet 5 mg    ramelteon tablet 8 mg    sodium chloride 0.9% flush 10 mL    vancomycin in dextrose 5 % 1 gram/250 mL IVPB 1,000 mg     Objective:     Vital Signs (Most Recent):  Temp: 98.1 °F (36.7 °C) (10/28/19 0420)  Pulse: 87 (10/28/19 0420)  Resp: 18 (10/28/19 0420)  BP: (!) 154/96 (10/28/19 0420)  SpO2: (!) 94 % (10/28/19 0420) Vital Signs (24h Range):  Temp:  [96.8 °F (36 °C)-99.5 °F (37.5 °C)] 98.1 °F (36.7 °C)  Pulse:  [77-98] 87  Resp:  [16-18] 18  SpO2:  [94 %-97 %] 94 %  BP: (140-158)/(77-96) 154/96     Weight: 79.4 kg (175 lb)  Height: 5' 11" (180.3 cm)  Body mass index is 24.41 kg/m².    No intake or output data in the 24 hours ending 10/28/19 0602    Ortho/SPM Exam    AAOx4  NAD  Reg rate  No increased WOB  Dressing " c/d/i  Drain in place with bloody fluid  Polar ice in place  SILT T/SP/DP/[place]/Sa  Motor intact T/SP/DP  WWP extremities  FCDs in place and functioning    Significant Labs:   CBC:   Recent Labs   Lab 10/26/19  1210 10/27/19  0451   WBC 6.78 7.47   HGB 11.7* 10.8*   HCT 36.8* 34.5*   * 438*     CMP:   Recent Labs   Lab 10/26/19  1210 10/27/19  0451    136   K 4.5 4.5    103   CO2 26 27   GLU 86 106   BUN 18 16   CREATININE 0.8 0.9   CALCIUM 9.3 8.8   PROT 6.8 6.2   ALBUMIN 2.7* 2.5*   BILITOT 0.9 0.6   ALKPHOS 64 59   AST 22 19   ALT 51* 36   ANIONGAP 9 6*   EGFRNONAA >60.0 >60.0     CRP:   Recent Labs   Lab 10/26/19  1210   .8*     All pertinent labs within the past 24 hours have been reviewed.    Significant Imaging: I have reviewed all pertinent imaging results/findings.    Assessment/Plan:     * Septic arthritis of knee, left  69 y.o. male POD2 s/p Left knee I and D 10/26/19     Pain control: multimodal  PT/OT: WBAT LLE in HKB 0-90  DVT PPx: ambulate, FCDs at all times when not ambulating, lovenox  Abx: postop vanc, awaiting ID final recs. Growing Staph lugodensis. picc line ordered  Labs: Hgb stable at 10, WBC 7  Drain: minimal output     Garay: none     Dispo: f/u PT recs, awaiting ID recs. vanc and rocephin for now.    Leukopenia  69 y.o. male POD1 s/p Left knee I and D 10/26/19    Pain control: multimodal  PT/OT: WBAT LLE in HKB 0-90  DVT PPx: ambulate, FCDs at all times when not ambulating  Abx: postop vanc and rocephin, awaiting ID recs. Growing Staph lugodensis  Labs: Hgb stable, WBC 7  Drain: minimal output    Garay: none    Dispo: f/u PT recs, awaiting ID recs. vanc and rocephin for now.      Elevated BP without diagnosis of hypertension  moniotr BP          Gerald Montesinos MD  Orthopedics  [facility]

## 2019-10-28 NOTE — OP NOTE
?[facility]   OPERATIVE REPORT   ORTHOPAEDIC SURGERY   PROVIDER: DR. SHIRA SIEGEL    PATIENT INFORMATION   Logan Massey 69 y.o. male 1950   MRN: [id number]   LOCATION: [facility]     DATE OF PROCEDURE: 10/26/2019     PREOPERATIVE DIAGNOSES:   Left knee septic arthritis    POSTOPERATIVE DIAGNOSES:   Left knee septic arthritis  Left knee osteoarthritis  Left knee degenerative medial and lateral meniscus tears    PROCEDURES PERFORMED:   Left knee arthroscopic extensive debridement  Left knee arthroscopic medial and lateral partial meniscectomies    Surgeon(s) and Role:     * MANDA Siegel MD - Primary     * Shane Ha MD - Resident - Assisting     * Sharona Gomez MD - Resident - Assisting     * Gerald Montesinos MD - Resident - Assisting    ANESTHESIA: General     ESTIMATED BLOOD LOSS: 15 cc    IMPLANTS: * No implants in log *     FINDINGS:  Purulent synovial fluid     SPECIMENS:   Specimen (12h ago, onward)    None        COMPLICATIONS: None.     INTRAOPERATIVE COUNTS: Correct.     PROPHYLACTIC IV ANTIBIOTICS: Given per [facility] Protocol following intraoperative cultures.    INDICATIONS FOR PROCEDURE: Logan montgomery is a very nice 69-year-old gentleman with history of chronic intermittent bilateral knee pain. He was recently seen in the Orthopedic Clinic specifically for his left knee.  An aspiration procedure was performed and this was sent for laboratory analysis and cultures.  Culture results came back positive for a staphylococcal species.  The patient is provider then contacted one of my orthopedic colleagues who then contacted me as the on-call physician to take care of the patient. The patient lives more than 2 hr away in [place] and was called back into the hospital for washout of the left knee. Full informed consent was obtained prior to proceeding.    DETAILS OF PROCEDURE: Logan was met in the preoperative holding area.  The operative site was identified and marked. All  final questions were answered.  The patient was then brought back to the operating room and placed supine.  General anesthesia was administered.  The patient was found to have a very large left knee effusion. The left leg was placed in an arthroscopic moore.  The right lower extremity was placed in a well leg moore.  All bony prominences were well padded. Verbal time-out was performed.  Antibiotics were on hold for cultures.  A standard anterolateral arthroscopic portal was created.  Cultures were taken and then antibiotics were given. The arthroscope was introduced.  Confluent purulent fluid was encountered. An anteromedial arthroscopic portal was created and a shaver was introduced to clear the intra-articular space of some purulence.  Diffuse synovitis was encountered in the anterior recess and this was extensively debrided.  Care was taken to maintain hemostasis with the arthroscopic cautery device.  The patient was found to have grade 3 to 4 chondromalacia in the medial compartment with associated degenerative medial meniscus tearing.  An arthroscopic shaver was introduced to perform a partial medial meniscectomy.  Within the intercondylar notch the PCL was found to be intact. There was intrasubstance attenuation and degeneration of the ACL.  It otherwise was intact.  Laterally there was additional chondromalacia predominantly grade 2-3, again with a degenerative type lateral meniscus tear.  Again the arthroscopic shaver was used to perform a partial lateral meniscectomy.  The arthroscope was then reintroduced into the patellofemoral compartment.  Chondroplasty was performed in all 3 compartments of the knee removing loose chondral debris. Additional fluid was irrigated through the knee as the shaver was used to remove all nonviable and inflamed tissue. A total of 9 L were used to irrigate the knee. A Hemovac drain was then placed through a superolateral entry point and secured.  Sterile dressings were  applied along with a T scope brace locked in extension. The patient was then extubated and transferred to the postanesthesia care unit stable condition.    POSTOPERATIVE PLAN: The patient will be admitted for IV antibiotics.  We will follow his culture results and seek guidance of the infectious disease team.

## 2019-10-28 NOTE — PLAN OF CARE
Problem: Physical Therapy Goal  Goal: Physical Therapy Goal  Description  Goals to be met by: 11/10/2019    Patient will increase functional independence with mobility by performin. Supine to sit with Set-up [place]  2. Sit to supine with Set-up [place]  3. Sit to stand transfer with Supervision  4. Gait  x 150 feet with Supervision using Rolling Walker.   5. Ascend/descend 3 stair without Handrail(s) and Contact Guard Assistance using Rolling Walker.   6. Lower extremity exercise program x30 reps per handout, with independence   Outcome: Ongoing, Progressing     Patient progressing appropriately towards current goals and plan of care remains appropriate at this time.     Zafar William, PT, DPT  10/28/2019  Pager #: [phone]

## 2019-10-28 NOTE — PT/OT/SLP PROGRESS
Physical Therapy Treatment    Patient Name:  Logan Massey   MRN:  [id number]    Recommendations:     Discharge Recommendations:  home with home health   Discharge Equipment Recommendations: bedside commode, walker, rolling   Barriers to discharge: None    Assessment:     Logan Massey is a 69 y.o. male admitted with a medical diagnosis of Septic arthritis of knee, left.  He presents with the following impairments/functional limitations:  weakness, impaired functional mobilty, impaired endurance, gait instability, pain, impaired joint extensibility, impaired balance, impaired sensation, decreased lower extremity function, decreased ROM, orthopedic precautions, impaired muscle length, impaired self care skills.      Patient demonstrates good motivation and good activity tolerance secondary appropriate pain control.  Patient requires between stand by assistance and contact guard assistance for bed mobility, transfers and ambulation secondary to good safety and sequencing.  Patient demonstrates improving activity tolerance with good pain control.  Patient ascended/descended 6 inch curb with rolling walker and CGA.  Patient tolerated ambulation of 110 ft with rolling walker and CGA.  Patient continues with some trunk flexion and scapular elevation with R stance phase but demonstrating improving stride length and karl.  Patient progressing well with current POC.    Rehab Prognosis: Good; patient continues to benefit from acute skilled PT services to address these deficits and reach maximum level of function.  Patient remains most appropriate to discharge to home with home health  Recent Surgery: Procedure(s) (LRB):  ARTHROSCOPY, KNEE, WITH DEBRIDEMENT (Left) 2 Days Post-Op    Plan:     During this hospitalization, patient to be seen 6 x/week to address the identified rehab impairments via gait training, therapeutic activities, therapeutic exercises, neuromuscular re-education and progress toward the following  "goals:    · Plan of Care Expires:  11/26/19    Subjective     Subjective: "It feels a little better today than yesterday"   Pain/Comfort:  · Pain Rating 1: 2/10  · Location - Side 1: Left  · Location - Orientation 1: generalized  · Location 1: knee  · Pain Addressed 1: Reposition, Cessation of Activity  · Pain Rating Post-Intervention 1: 2/10      Objective:     Communicated with RN prior to session.  Patient found ambulating in hallway with wife with cryotherapy upon PT entry to room.     General Precautions: Standard, fall   Orthopedic Precautions:RLE weight bearing as tolerated   Braces:       Functional Mobility:  · Transfers:     · Sit to Stand:  stand by assistance with rolling walker and good LE placement.  · Gait: Patient ambulated 110 ft with rolling walker and contact guard assistance   · Stairs:  Pt ascended/descended 6" curb step with Rolling Walker with no handrails with Contact Guard Assistance.       AM-PAC 6 CLICK MOBILITY  Turning over in bed (including adjusting bedclothes, sheets and blankets)?: 4  Sitting down on and standing up from a chair with arms (e.g., wheelchair, bedside commode, etc.): 3  Moving from lying on back to sitting on the side of the bed?: 3  Moving to and from a bed to a chair (including a wheelchair)?: 3  Need to walk in hospital room?: 3  Climbing 3-5 steps with a railing?: 3  Basic Mobility Total Score: 19       Therapeutic Activities and Exercises:   TKA Exercises: Patient performed 1 set(s) of 10 repetitions of  ankle pumps, quad sets, glut sets and heel slides for right LE.  Gait training:  Patient required cues for scapular depression, sequencing and upright posture to increase independence and safety.  Patient required cues ~ 25% of the time.    Patient Education:    Patient educated on Fall risk, gait training, home safety, Home exercise program and transfer training by explanation.  Patient was receptive to education and verbalizes understanding.     Patient left up " in chair with all lines intact, call button in reach and spouse present.    GOALS:   Multidisciplinary Problems     Physical Therapy Goals        Problem: Physical Therapy Goal    Goal Priority Disciplines Outcome Goal Variances Interventions   Physical Therapy Goal     PT, PT/OT Ongoing, Progressing     Description:  Goals to be met by: 11/10/2019    Patient will increase functional independence with mobility by performin. Supine to sit with Set-up [place]  2. Sit to supine with Set-up [place]  3. Sit to stand transfer with Supervision  4. Gait  x 150 feet with Supervision using Rolling Walker.   5. Ascend/descend 3 stair without Handrail(s) and Contact Guard Assistance using Rolling Walker.   6. Lower extremity exercise program x30 reps per handout, with independence                    Time Tracking:     PT Received On: 10/28/19  PT Start Time: 1200     PT Stop Time: 1214  PT Total Time (min): 14 min     Billable Minutes: Gait Training 14 min    Treatment Type: Treatment  PT/PTA: PT     PTA Visit Number: 0     Zafar William, PT  10/28/2019

## 2019-10-28 NOTE — PLAN OF CARE
"CM to bedside - pt is out of room at a procedure; pt's spouse present and provided assessment info. Pt w/ a cane in place, lives w/ spouse. Pt will likely d/c home w/ h/h as he is expected to need 6 wks of IV abx. Pt's spouse reports no preference on h/h or home infusion. CM will forward referral to [facility].    CM provided patient anticipated MARY which will be update by nursing staff. Patient was not provided a Blue "My Health Packet" for d/c planning and health tool - office out. Patient verbalized understanding.    Ortho YING ERICKSON [id number]     10/28/19 1052   Discharge Assessment   Assessment Type Discharge Planning Assessment   Confirmed/corrected address and phone number on facesheet? Yes   Assessment information obtained from? Patient;Caregiver   Expected Length of Stay (days) 5   Communicated expected length of stay with patient/caregiver yes   Prior to hospitilization cognitive status: Alert/Oriented   Prior to hospitalization functional status: Assistive Equipment   Current cognitive status: Alert/Oriented   Current Functional Status: Assistive Equipment   Facility Arrived From: N/A   Lives With spouse   Able to Return to Prior Arrangements yes   Is patient able to care for self after discharge? Unable to determine at this time (comments)   Who are your caregiver(s) and their phone number(s)? spouse - Fabiano [phone]   Patient's perception of discharge disposition home health   Readmission Within the Last 30 Days no previous admission in last 30 days   Patient currently being followed by outpatient case management? No   Patient currently receives any other outside agency services? No   Equipment Currently Used at Home cane, straight   Do you have any problems affording any of your prescribed medications? No   Is the patient taking medications as prescribed? yes   Does the patient have transportation home? Yes  (pt's spouse will take pt home at d/c)   Transportation Anticipated car, drives self "   Dialysis Name and Scheduled days N/A   Does the patient receive services at the [facility]? No   Discharge Plan A Home with family;Home Health   Discharge Plan B Skilled Nursing Facility   DME Needed Upon Discharge  other (see comments)  (TBD)   Patient/Family in Agreement with Plan yes

## 2019-10-28 NOTE — PROGRESS NOTES
Pharmacokinetic Assessment Follow Up: IV Vancomycin    Vancomycin serum concentration assessment(s):    The trough level was drawn correctly and can be used to guide therapy at this time. The measurement is below the desired definitive target range of 15 to 20 mcg/mL.  Trough =8 on 10/28    Vancomycin Regimen Plan:    Change regimen to Vancomycin 1500 mg IV every 12 hours with next serum trough concentration measured at 0430 prior to 4th dose on 10/30    Drug levels (last 3 results):  Recent Labs   Lab Result Units 10/28/19  0437   Vancomycin-Trough ug/mL 8.0*       Pharmacy will continue to follow and monitor vancomycin.    Please contact pharmacy at extension [phone] for questions regarding this assessment.    Thank you for the consult,   Renetta Gibson       Patient brief summary:  Logan Massey is a 69 y.o. male initiated on antimicrobial therapy with IV Vancomycin for treatment of bone/joint infection    The patient's current regimen is 79.4 kg    Drug Allergies:   Review of patient's allergies indicates:  No Known Allergies    Actual Body Weight:   79.4 kg     Renal Function:   Estimated Creatinine Clearance: 83.7 mL/min (based on SCr of 0.9 mg/dL).,     Dialysis Method (if applicable):  N/A    CBC (last 72 hours):  Recent Labs   Lab Result Units 10/26/19  1210 10/27/19  0451   WBC K/uL 6.78 7.47   Hemoglobin g/dL 11.7* 10.8*   Hematocrit % 36.8* 34.5*   Platelets K/uL 429* 438*   Gran% % 64.9 69.6   Lymph% % 22.7 19.1   Mono% % 9.3 8.8   Eosinophil% % 2.2 1.5   Basophil% % 0.6 0.7   Differential Method  Automated Automated       Metabolic Panel (last 72 hours):  Recent Labs   Lab Result Units 10/26/19  1210 10/27/19  0451   Sodium mmol/L 139 136   Potassium mmol/L 4.5 4.5   Chloride mmol/L 104 103   CO2 mmol/L 26 27   Glucose mg/dL 86 106   BUN, Bld mg/dL 18 16   Creatinine mg/dL 0.8 0.9   Albumin g/dL 2.7* 2.5*   Total Bilirubin mg/dL 0.9 0.6   Alkaline Phosphatase U/L 64 59   AST U/L 22 19   ALT U/L 51* 36        Vancomycin Administrations:  vancomycin given in the last 96 hours                   vancomycin in dextrose 5 % 1 gram/250 mL IVPB 1,000 mg (mg) 1,000 mg New Bag 10/28/19 0440     1,000 mg New Bag 10/27/19 1447     1,000 mg New Bag  0530    vancomycin in dextrose 5 % 1 gram/250 mL IVPB 1,000 mg (mg) 1,000 mg Given 10/26/19 1500                Microbiologic Results:  Microbiology Results (last 7 days)     Procedure Component Value Units Date/Time    Culture, Anaerobe [[id number]] Collected:  10/26/19 1511    Order Status:  Completed Specimen:  Joint Fluid from Knee, Left Updated:  10/28/19 0734     Anaerobic Culture Culture in progress    Narrative:       1.Left Knee    Culture, Anaerobe [[id number]] Collected:  10/26/19 1511    Order Status:  Completed Specimen:  Joint Fluid from Knee, Left Updated:  10/28/19 0734     Anaerobic Culture Culture in progress    Narrative:       2. Left Knee    Gram stain [[id number]] Collected:  10/26/19 1511    Order Status:  Completed Specimen:  Joint Fluid from Knee, Left Updated:  10/27/19 1042     Gram Stain Result Few WBC's      No organisms seen    Narrative:       1.Left Knee    Gram stain [[id number]] Collected:  10/26/19 1511    Order Status:  Completed Specimen:  Joint Fluid from Knee, Left Updated:  10/27/19 1040     Gram Stain Result Many WBC's      No organisms seen    Narrative:       2. Left Knee    Fungus culture [[id number]] Collected:  10/26/19 1511    Order Status:  Sent Specimen:  Joint Fluid from Knee, Left Updated:  10/27/19 0729    AFB Culture & Smear [[id number]] Collected:  10/26/19 1511    Order Status:  Sent Specimen:  Joint Fluid from Knee, Left Updated:  10/27/19 0729    Aerobic culture [[id number]] Collected:  10/26/19 1511    Order Status:  Sent Specimen:  Joint Fluid from Knee, Left Updated:  10/27/19 0728    Aerobic culture [[id number]] Collected:  10/26/19 1511    Order Status:  Sent Specimen:  Joint Fluid from Knee, Left Updated:  10/27/19 0726     Fungus culture [[id number]] Collected:  10/26/19 1511    Order Status:  Sent Specimen:  Joint Fluid from Knee, Left Updated:  10/27/19 0725    AFB Culture & Smear [[id number]] Collected:  10/26/19 1511    Order Status:  Sent Specimen:  Joint Fluid from Knee, Left Updated:  10/27/19 0721

## 2019-10-28 NOTE — ASSESSMENT & PLAN NOTE
69 year old male with PMH of left knee arthritis, G6PD deficiency, and prostate cancer who presents with swelling and pain of the left knee. Pt with knee pain over the past two months with recurrent swelling after aspirations.   He presented to orthopedics clinic where the knee was aspirated revealing 7,420 WBCs with 96% segs.  Cultures from this aspiration grew Staph Lugdunensis. He denies any fevers, recent illnesses, or recent procedures/dental work. Pt is s/p left knee I and D on 10/26. TTE negative for vegetations.     Plan  1. D/c vancomycin and start cefazolin 6 gram IV continuous infusion for sensitive staph lugdunensis x 6 weeks from date of surgery (estimated end date: 12/7/19)  2. Check weekly CBC, CMP, ESR, and CRP with results faxed to ID at [phone]  3. Will need PICC line prior to discharge  4. F/u same day as ortho in 2-3 weeks  5. ID will sign off

## 2019-10-28 NOTE — PROGRESS NOTES
[facility]  Infectious Disease  Progress Note    Patient Name: Logan Massey  MRN: [id number]  Admission Date: 10/26/2019  Length of Stay: 2 days  Attending Physician: MANDA Roger MD  Primary Care Provider: Silvestre Celeste DO    Isolation Status: No active isolations  Assessment/Plan:      * Septic arthritis of knee, left  69 year old male with PMH of left knee arthritis, G6PD deficiency, and prostate cancer who presents with swelling and pain of the left knee. Pt with knee pain over the past two months with recurrent swelling after aspirations.   He presented to orthopedics clinic where the knee was aspirated revealing 7,420 WBCs with 96% segs.  Cultures from this aspiration grew Staph Lugdunensis. He denies any fevers, recent illnesses, or recent procedures/dental work. Pt is s/p left knee I and D on 10/26. TTE negative for vegetations.     Plan  1. D/c vancomycin and start cefazolin 6 gram IV continuous infusion for sensitive staph lugdunensis x 4 weeks from date of surgery (estimated end date: 11/23/19) for septic arthritis  2. Check weekly CBC, CMP, ESR, and CRP with results faxed to ID at [phone]  3. Will need PICC line prior to discharge  4. F/u same day as ortho in 2-3 weeks  5. ID will sign off    Anticipated Disposition: IV antibiotics  Thank you for your consult. I will sign off. Please contact us if you have any additional questions.  NAHEED Martins Pager: [phone]  Infectious Disease  [facility]    Subjective:     Principal Problem:Septic arthritis of knee, left    HPI: Pt is a 69 y.o. male with PMH left knee arthritis, G6PD deficiency, and prostate cancer who presents with swelling and pain of the left knee. Pt with knee pain over the past two months with recurrent swelling after aspirations.   He presented to orthopedics clinic 2 days ago where the knee was aspirated again. Aspiration showed-7,420 WBCs 96% segs.  Cultures from this aspiration are  growing Staph Libransis. He denies any fevers, recent illnesses, or recent procedures/dental work. He presents to [facility] for surgery today to treat his infection.  Pt s/p l knee I and D on 10/26  Interval History: PICC time at bedside. Knee brace in place. (+) constipation. No fevers. No diarrhea. No skin rashes.     Review of Systems   Constitutional: Negative for chills and fever.   Respiratory: Negative for cough and shortness of breath.    Cardiovascular: Negative for chest pain and palpitations.   Gastrointestinal: Positive for constipation. Negative for abdominal distention, abdominal pain, diarrhea, nausea and vomiting.   Musculoskeletal: Positive for arthralgias (knee pain) and joint swelling. Negative for back pain.   Skin: Negative for pallor and wound.     Objective:     Vital Signs (Most Recent):  Temp: 96 °F (35.6 °C) (10/28/19 0810)  Pulse: 82 (10/28/19 0815)  Resp: 17 (10/28/19 0810)  BP: (!) 148/92 (10/28/19 0815)  SpO2: 100 % (10/28/19 1100) Vital Signs (24h Range):  Temp:  [96 °F (35.6 °C)-99.5 °F (37.5 °C)] 96 °F (35.6 °C)  Pulse:  [74-98] 82  Resp:  [16-18] 17  SpO2:  [94 %-100 %] 100 %  BP: (148-167)/(80-97) 148/92     Weight: 79.4 kg (175 lb)  Body mass index is 24.41 kg/m².    Estimated Creatinine Clearance: 83.7 mL/min (based on SCr of 0.9 mg/dL).    Physical Exam   Constitutional: He is oriented to person, place, and time. He appears well-developed and well-nourished. No distress.   Cardiovascular: Normal rate and regular rhythm.   No murmur heard.  Pulmonary/Chest: Breath sounds normal. No respiratory distress. He has no wheezes. He has no rales.   Abdominal: Soft. Bowel sounds are normal. He exhibits no distension. There is no tenderness.   Musculoskeletal: Normal range of motion. He exhibits no edema, tenderness or deformity.   Left knee brace in place   Neurological: He is alert and oriented to person, place, and time.   Skin: Skin is warm and dry. He is not diaphoretic. No erythema.        Significant Labs:   Blood Culture: No results for input(s): LABBLOO in the last 4320 hours.  CBC:   Recent Labs   Lab 10/27/19  0451   WBC 7.47   HGB 10.8*   HCT 34.5*   *     CMP:   Recent Labs   Lab 10/27/19  0451      K 4.5      CO2 27      BUN 16   CREATININE 0.9   CALCIUM 8.8   PROT 6.2   ALBUMIN 2.5*   BILITOT 0.6   ALKPHOS 59   AST 19   ALT 36   ANIONGAP 6*   EGFRNONAA >60.0     Wound Culture:   Recent Labs   Lab 10/26/19  1511   LABAERO No significant isolate to date       Significant Imaging: I have reviewed all pertinent imaging results/findings within the past 24 hours.

## 2019-10-28 NOTE — NURSING
Automatic /97.  Reassessed with manual cuff @ 148/92.  Attending team paged x2 but is unaware.  Pt off the floor to echocardiogram.  Will continue to monitor upon return.

## 2019-10-28 NOTE — SUBJECTIVE & OBJECTIVE
Interval History: PICC time at bedside. Knee brace in place. (+) constipation. No fevers. No diarrhea. No skin rashes.     Review of Systems   Constitutional: Negative for chills and fever.   Respiratory: Negative for cough and shortness of breath.    Cardiovascular: Negative for chest pain and palpitations.   Gastrointestinal: Positive for constipation. Negative for abdominal distention, abdominal pain, diarrhea, nausea and vomiting.   Musculoskeletal: Positive for arthralgias (knee pain) and joint swelling. Negative for back pain.   Skin: Negative for pallor and wound.     Objective:     Vital Signs (Most Recent):  Temp: 96 °F (35.6 °C) (10/28/19 0810)  Pulse: 82 (10/28/19 0815)  Resp: 17 (10/28/19 0810)  BP: (!) 148/92 (10/28/19 0815)  SpO2: 100 % (10/28/19 1100) Vital Signs (24h Range):  Temp:  [96 °F (35.6 °C)-99.5 °F (37.5 °C)] 96 °F (35.6 °C)  Pulse:  [74-98] 82  Resp:  [16-18] 17  SpO2:  [94 %-100 %] 100 %  BP: (148-167)/(80-97) 148/92     Weight: 79.4 kg (175 lb)  Body mass index is 24.41 kg/m².    Estimated Creatinine Clearance: 83.7 mL/min (based on SCr of 0.9 mg/dL).    Physical Exam   Constitutional: He is oriented to person, place, and time. He appears well-developed and well-nourished. No distress.   Cardiovascular: Normal rate and regular rhythm.   No murmur heard.  Pulmonary/Chest: Breath sounds normal. No respiratory distress. He has no wheezes. He has no rales.   Abdominal: Soft. Bowel sounds are normal. He exhibits no distension. There is no tenderness.   Musculoskeletal: Normal range of motion. He exhibits no edema, tenderness or deformity.   Left knee brace in place   Neurological: He is alert and oriented to person, place, and time.   Skin: Skin is warm and dry. He is not diaphoretic. No erythema.       Significant Labs:   Blood Culture: No results for input(s): LABBLOO in the last 4320 hours.  CBC:   Recent Labs   Lab 10/27/19  0451   WBC 7.47   HGB 10.8*   HCT 34.5*   *     CMP:    Recent Labs   Lab 10/27/19  0451      K 4.5      CO2 27      BUN 16   CREATININE 0.9   CALCIUM 8.8   PROT 6.2   ALBUMIN 2.5*   BILITOT 0.6   ALKPHOS 59   AST 19   ALT 36   ANIONGAP 6*   EGFRNONAA >60.0     Wound Culture:   Recent Labs   Lab 10/26/19  1511   LABAERO No significant isolate to date       Significant Imaging: I have reviewed all pertinent imaging results/findings within the past 24 hours.

## 2019-10-28 NOTE — ASSESSMENT & PLAN NOTE
69 y.o. male POD2 s/p Left knee I and D 10/26/19     Pain control: multimodal  PT/OT: WBAT LLE in HKB 0-90  DVT PPx: ambulate, FCDs at all times when not ambulating, lovenox  Abx: postop vanc, awaiting ID final recs. Growing Staph lugodensis. picc line ordered  Labs: Hgb stable at 10, WBC 7  Drain: minimal output     Garay: none     Dispo: f/u PT recs, awaiting ID recs. vanc and rocephin for now.

## 2019-10-28 NOTE — CONSULTS
Double lumen PICC placed in right brachial vein, 36 cm in length, 0 cm exposed with arm circumference 31 cm. Lot# [id number]

## 2019-10-28 NOTE — PROCEDURES
"Logan Massey is a 69 y.o. male patient.    Temp: 96 °F (35.6 °C) (10/28/19 0810)  Pulse: 82 (10/28/19 0815)  Resp: 17 (10/28/19 0810)  BP: (!) 148/92 (10/28/19 0815)  SpO2: 100 % (10/28/19 0810)  Weight: 79.4 kg (175 lb) (10/28/19 0815)  Height: 5' 11" (180.3 cm) (10/28/19 0815)    PICC  Performed by: Rodger Moss RN  Assisting provider: Bella Lundberg RN  Time out: Immediately prior to procedure a time out was called to verify the correct patient, procedure, equipment, support staff and site/side marked as required  Indications: med administration and vascular access  Anesthesia: local infiltration  Local anesthetic: lidocaine 1% without epinephrine  Anesthetic Total (mL): 2  Preparation: skin prepped with ChloraPrep  Skin prep agent dried: skin prep agent completely dried prior to procedure  Sterile barriers: all five maximum sterile barriers used - cap, mask, sterile gown, sterile gloves, and large sterile sheet  Hand hygiene: hand hygiene performed prior to central venous catheter insertion  Location details: right brachial  Catheter type: double lumen  Catheter size: 5 Fr  Catheter Length: 36cm    Ultrasound guidance: yes  Vessel Caliber: medium and patent, compressibility normal  Needle advanced into vessel with real time Ultrasound guidance.  Guidewire confirmed in vessel.  Image recorded and saved.  Sterile sheath used.  Number of attempts: 1  Post-procedure: blood return through all ports, chlorhexidine patch and sterile dressing applied  Technical procedures used: 3CG  Specimens: No  Implants: No  Assessment: placement verified by x-ray  Complications: none          Bella Lundberg  10/28/2019  "

## 2019-10-29 ENCOUNTER — TELEPHONE (OUTPATIENT)
Dept: SPORTS MEDICINE | Facility: CLINIC | Age: 69
End: 2019-10-29

## 2019-10-29 LAB
ALBUMIN SERPL BCP-MCNC: 2.7 G/DL (ref 3.5–5.2)
ALP SERPL-CCNC: 61 U/L (ref 55–135)
ALT SERPL W/O P-5'-P-CCNC: 46 U/L (ref 10–44)
ANION GAP SERPL CALC-SCNC: 6 MMOL/L (ref 8–16)
AST SERPL-CCNC: 31 U/L (ref 10–40)
BACTERIA FLD AEROBE CULT: NO GROWTH
BACTERIA SPEC AEROBE CULT: NORMAL
BACTERIA SPEC AEROBE CULT: NORMAL
BASOPHILS # BLD AUTO: 0.05 K/UL (ref 0–0.2)
BASOPHILS NFR BLD: 0.9 % (ref 0–1.9)
BILIRUB SERPL-MCNC: 0.5 MG/DL (ref 0.1–1)
BUN SERPL-MCNC: 11 MG/DL (ref 8–23)
CALCIUM SERPL-MCNC: 9.4 MG/DL (ref 8.7–10.5)
CHLORIDE SERPL-SCNC: 100 MMOL/L (ref 95–110)
CO2 SERPL-SCNC: 29 MMOL/L (ref 23–29)
CREAT SERPL-MCNC: 0.8 MG/DL (ref 0.5–1.4)
DIFFERENTIAL METHOD: ABNORMAL
EOSINOPHIL # BLD AUTO: 0.3 K/UL (ref 0–0.5)
EOSINOPHIL NFR BLD: 4.9 % (ref 0–8)
ERYTHROCYTE [DISTWIDTH] IN BLOOD BY AUTOMATED COUNT: 12.8 % (ref 11.5–14.5)
EST. GFR  (AFRICAN AMERICAN): >60 ML/MIN/1.73 M^2
EST. GFR  (NON AFRICAN AMERICAN): >60 ML/MIN/1.73 M^2
GLUCOSE SERPL-MCNC: 107 MG/DL (ref 70–110)
GRAM STN SPEC: NORMAL
GRAM STN SPEC: NORMAL
HCT VFR BLD AUTO: 37.1 % (ref 40–54)
HGB BLD-MCNC: 11.4 G/DL (ref 14–18)
IMM GRANULOCYTES # BLD AUTO: 0.02 K/UL (ref 0–0.04)
IMM GRANULOCYTES NFR BLD AUTO: 0.3 % (ref 0–0.5)
LYMPHOCYTES # BLD AUTO: 1.8 K/UL (ref 1–4.8)
LYMPHOCYTES NFR BLD: 31.1 % (ref 18–48)
MCH RBC QN AUTO: 30.7 PG (ref 27–31)
MCHC RBC AUTO-ENTMCNC: 30.7 G/DL (ref 32–36)
MCV RBC AUTO: 100 FL (ref 82–98)
MONOCYTES # BLD AUTO: 0.5 K/UL (ref 0.3–1)
MONOCYTES NFR BLD: 8.7 % (ref 4–15)
NEUTROPHILS # BLD AUTO: 3.2 K/UL (ref 1.8–7.7)
NEUTROPHILS NFR BLD: 54.1 % (ref 38–73)
NRBC BLD-RTO: 0 /100 WBC
PLATELET # BLD AUTO: 512 K/UL (ref 150–350)
PMV BLD AUTO: 9 FL (ref 9.2–12.9)
POTASSIUM SERPL-SCNC: 4.2 MMOL/L (ref 3.5–5.1)
PROT SERPL-MCNC: 6.7 G/DL (ref 6–8.4)
RBC # BLD AUTO: 3.71 M/UL (ref 4.6–6.2)
SODIUM SERPL-SCNC: 135 MMOL/L (ref 136–145)
SPECIMEN SOURCE: NORMAL
URATE FLD-MCNC: 3.2 MG/DL
WBC # BLD AUTO: 5.86 K/UL (ref 3.9–12.7)

## 2019-10-29 PROCEDURE — 36415 COLL VENOUS BLD VENIPUNCTURE: CPT

## 2019-10-29 PROCEDURE — 85025 COMPLETE CBC W/AUTO DIFF WBC: CPT

## 2019-10-29 PROCEDURE — 25000003 PHARM REV CODE 250: Performed by: STUDENT IN AN ORGANIZED HEALTH CARE EDUCATION/TRAINING PROGRAM

## 2019-10-29 PROCEDURE — A4216 STERILE WATER/SALINE, 10 ML: HCPCS | Performed by: ORTHOPAEDIC SURGERY

## 2019-10-29 PROCEDURE — 80053 COMPREHEN METABOLIC PANEL: CPT

## 2019-10-29 PROCEDURE — 11000001 HC ACUTE MED/SURG PRIVATE ROOM

## 2019-10-29 PROCEDURE — 97116 GAIT TRAINING THERAPY: CPT

## 2019-10-29 PROCEDURE — 63600175 PHARM REV CODE 636 W HCPCS: Performed by: PHYSICIAN ASSISTANT

## 2019-10-29 PROCEDURE — 25000003 PHARM REV CODE 250: Performed by: ORTHOPAEDIC SURGERY

## 2019-10-29 PROCEDURE — 94761 N-INVAS EAR/PLS OXIMETRY MLT: CPT

## 2019-10-29 PROCEDURE — 97530 THERAPEUTIC ACTIVITIES: CPT

## 2019-10-29 PROCEDURE — 97535 SELF CARE MNGMENT TRAINING: CPT

## 2019-10-29 RX ORDER — LABETALOL HCL 20 MG/4 ML
5 SYRINGE (ML) INTRAVENOUS ONCE
Status: COMPLETED | OUTPATIENT
Start: 2019-10-29 | End: 2019-10-29

## 2019-10-29 RX ORDER — ASPIRIN 81 MG/1
81 TABLET ORAL 2 TIMES DAILY
Qty: 120 TABLET | Refills: 0 | Status: SHIPPED | OUTPATIENT
Start: 2019-10-29 | End: 2019-12-28

## 2019-10-29 RX ORDER — ASPIRIN 81 MG/1
81 TABLET ORAL 2 TIMES DAILY
Status: DISCONTINUED | OUTPATIENT
Start: 2019-10-29 | End: 2019-10-30 | Stop reason: HOSPADM

## 2019-10-29 RX ADMIN — DOCUSATE SODIUM 100 MG: 100 CAPSULE, LIQUID FILLED ORAL at 08:10

## 2019-10-29 RX ADMIN — Medication 10 ML: at 05:10

## 2019-10-29 RX ADMIN — OXYCODONE HYDROCHLORIDE 10 MG: 10 TABLET ORAL at 03:10

## 2019-10-29 RX ADMIN — OXYCODONE HYDROCHLORIDE 10 MG: 10 TABLET ORAL at 11:10

## 2019-10-29 RX ADMIN — NAPROXEN 500 MG: 500 TABLET ORAL at 05:10

## 2019-10-29 RX ADMIN — LABETALOL HCL IV SOLN PREFILLED SYRINGE 20 MG/4ML (5 MG/ML) 5 MG: 20/4 SOLUTION PREFILLED SYRINGE at 12:10

## 2019-10-29 RX ADMIN — NAPROXEN 500 MG: 500 TABLET ORAL at 08:10

## 2019-10-29 RX ADMIN — DEXTROSE 6 G: 5 SOLUTION INTRAVENOUS at 02:10

## 2019-10-29 NOTE — SUBJECTIVE & OBJECTIVE
"Principal Problem:Septic arthritis of knee, left    Principal Orthopedic Problem: same    Interval History: Patient seen and examined at bedside.  No acute events overnight.  Pain controlled.  Growing StaphSTAPHYLOCOCCUS ROMANUNENSIS from aspiration 5 days ago. crusied 110 feet with therapy yesterday. BP issues yesterday and today. Nursing note reports paged twice to MD team, but was not aware of pressures. stablethis am.       Review of patient's allergies indicates:  No Known Allergies    Current Facility-Administered Medications   Medication    0.9%  NaCl infusion    0.9%  NaCl infusion    0.9%  NaCl infusion    acetaminophen tablet 650 mg    acetaminophen tablet 650 mg    ceFAZolin (ANCEF) 6 g in dextrose 5 % 500 mL CONTINUOUS INFUSION    docusate sodium capsule 100 mg    enoxaparin injection 40 mg    lidocaine (PF) 10 mg/ml (1%) injection 10 mg    naproxen tablet 500 mg    ondansetron disintegrating tablet 8 mg    ondansetron injection 4 mg    oxyCODONE immediate release tablet 10 mg    oxyCODONE immediate release tablet 5 mg    ramelteon tablet 8 mg    sodium chloride 0.9% flush 10 mL    sodium chloride 0.9% flush 10 mL    And    sodium chloride 0.9% flush 10 mL     Objective:     Vital Signs (Most Recent):  Temp: 97.9 °F (36.6 °C) (10/29/19 0458)  Pulse: 81 (10/29/19 0458)  Resp: 18 (10/29/19 0458)  BP: (!) 161/95 (10/29/19 0458)  SpO2: 95 % (10/29/19 0458) Vital Signs (24h Range):  Temp:  [96 °F (35.6 °C)-98.3 °F (36.8 °C)] 97.9 °F (36.6 °C)  Pulse:  [74-90] 81  Resp:  [17-18] 18  SpO2:  [95 %-100 %] 95 %  BP: (136-187)/(80-97) 161/95     Weight: 79.4 kg (175 lb)  Height: 5' 11" (180.3 cm)  Body mass index is 24.41 kg/m².      Intake/Output Summary (Last 24 hours) at 10/29/2019 0606  Last data filed at 10/29/2019 0333  Gross per 24 hour   Intake --   Output 1085 ml   Net -1085 ml       Ortho/SPM Exam    AAOx4  NAD  Reg rate  No increased WOB  Dressing c/d/i  Drain in place with bloody " fluid  Polar ice in place  SILT T/SP/DP/[place]/Sa  Motor intact T/SP/DP  WWP extremities  FCDs in place and functioning    Significant Labs:   pending        Significant Imaging: I have reviewed all pertinent imaging results/findings.

## 2019-10-29 NOTE — TELEPHONE ENCOUNTER
----- Message from Shae Miles sent at 10/29/2019  3:24 PM CDT -----  See PA    ----- Message -----  From: Gerald Montesinos MD  Sent: 10/29/2019   2:22 PM CDT  To: Kana Brady Staff    Please schedule fu in 2 weeks with pa sp left knee I and D for SA on 10/25/19 on ancef growing Staph Lugodensis.  thanks

## 2019-10-29 NOTE — PLAN OF CARE
Pt motivated for therapy and tolerated session well. Pt. Is SBA in t/f's and ambulation due to weakness and ortho precautions. Pt ambulated ~100 ft SBA with RW to increase activity tolerance required for adls and functional mobility. Pt. Is Supervision in toileting. Pt. Would benefit from HH PT following d/c to return to PLOF and decrease burden of care.       Problem: Occupational Therapy Goal  Goal: Occupational Therapy Goal  Description  Goals to be met by: 11/17/2019     Patient will increase functional independence with ADLs by performing:    Feeding with Modified [place].  UE Dressing with Modified [place].  LE Dressing with Set-up Assistance.  Grooming while standing with Supervision.  Toileting from toilet with Supervision for hygiene and clothing management.   Bathing from  sitting at sink with Supervision.  Supine to sit with Modified [place].  Step transfer with Supervision  Toilet transfer to toilet with Supervision.     Outcome: Ongoing, Progressing

## 2019-10-29 NOTE — ASSESSMENT & PLAN NOTE
69 y.o. male POD3 s/p Left knee I and D 10/26/19     Pain control: multimodal  PT/OT: WBAT LLE in HKB 0-90  DVT PPx: ambulate, FCDs at all times when not ambulating, lovenox  Abx: ancef per ID. Growing Staph lugodensis. picc line placed yesterday  Labs: Hgb stable at 10, WBC 7  Drain: 85     Garay: none     Dispo: f/u PT recs, ancef per ID, will discuss with staff

## 2019-10-29 NOTE — CARE UPDATE
Patient seen at bedside. Dressing changed, drain pulled. Tolerated well by pateint. Aspirin ordered bid upon dc. Will fu in 2 weeks

## 2019-10-29 NOTE — PLAN OF CARE
[facility]    HOME HEALTH ORDERS  FACE TO FACE ENCOUNTER    Patient Name: Logan Massey  YOB: 1950    PCP: Silvestre Celeste DO   PCP Address: [address] [place] [address] [place] [address]  PCP Phone Number: [phone]  PCP Fax: [phone]    Encounter Date: 10/29/2019    Admit to Home Health    Diagnoses:  Active Hospital Problems    Diagnosis  POA    *Septic arthritis of knee, left [M00.9]  Yes    Leukopenia [D72.819]  Yes    Elevated BP without diagnosis of hypertension [R03.0]  Yes      Resolved Hospital Problems   No resolved problems to display.       Future Appointments   Date Time Provider Department Center   11/1/2019  8:00 AM Shae Moffett PA-C [facility]   11/1/2019  8:00 AM JORDAN Telles [facility] [address]           I have seen and examined this patient face to face today. My clinical findings that support the need for the home health skilled services and home bound status are the following:  Weakness/numbness causing balance and gait disturbance due to Infection making it taxing to leave home.    Allergies:Review of patient's allergies indicates:  No Known Allergies    Diet: regular diet    Activities: activity as tolerated HKB 0-90  Home Health Admitting Clinician:   SN/PT to complete comprehensive assessment including routine vital signs. Instruct on disease process and s/s of complications to report to MD.  Review/verify medication list sent home with the patient at time of discharge  and instruct patient/caregiver as needed. If coumadin ordered, coumadin clinic to manage INR with INR draws 2x per week with a goal to maintain INR between 1.8 and 2.2. Frequency may be adjusted depending on start of care date.    Notify MD if SBP > 160 or < 90; DBP > 90 or < 50; HR > 120 or < 50; Temp > 101    Home Medical Equipment:  Walker, 3-1 bedside commode, transfer tub bench    CONSULTS:    Physical Therapy may admit if patient not on coumadin, PT to  perform comprehensive assessment if performing admit visit and generate therapy plan of care. Evaluate for home safety and equipment needs; Establish/upgrade home exercise program. Perform/instruct on therapeutic exercises, gait training, transfer training, and Range of Motion.      OTHER: (only select if patient needs other therapy disciplines)  Occupational Therapy to evaluate and treat. Evaluate home environment for safety and equipment needs. Perform/Instruct on transfers, ADL training, ROM, and therapeutic exercises.   to evaluate for community resources/long-range planning.    MISCELLANEOUS CARE:  LABS:  SN to perform labs:  CBC, BMP, CMP, ESR and CRP;  Frequency: Weekly ; Duration: 6 week(s). Fax results to ID at [phone]  HOME INFUSION THERAPY:   SN to perform Infusion Therapy/Central Line Care.  Review Central Line Care & Central Line Flush with patient.    Administer (drug and dose): ancef 6 grams every 24 hours for six weeks  (end date 11/23/19)  Last dose given: 10/29/19                         Home dose due: 10/30/19    Scrub the Hub: Prior to accessing the line, always perform a 30 second alcohol scrub  Each lumen of the central line is to be flushed at least daily with 10 mL Normal Saline and 3 mL Heparin flush (10 units/mL)  Skilled Nurse (SN) may draw blood from IV access  Blood Draw Procedure:   - Aspirate at least 5 mL of blood   - Discard   - Obtain specimen   - Change injection cap   - Flush with 20 mL Normal Saline followed by a                 3-5 mL Heparin flush (10 units/mL)  Central :   - Sterile dressing changes are done weekly and as needed.   - Use chlor-hexadine scrub to cleanse site, apply Biopatch to insertion site,       apply securement device dressing   - Injection caps are changed weekly and after EVERY lab draw.   - If sterile gauze is under dressing to control oozing,                 dressing change must be performed every 24 hours until gauze  is not needed.    WOUND CARE ORDERS:  Assess Surgical Incision/DSRG each TX  4x4 dressing in place. Call MD if any drainage reaches border to border of drsg horizontally, s/s of infection, temp >101, induration, swelling or redness.  If dressing is removed per MD order, then apply island dressing, change/teach caregiver to perform daily dressing change if island dressing present.    Medications: Review discharge medications with patient and family and provide education.      Current Discharge Medication List      START taking these medications    Details   ondansetron (ZOFRAN) 4 MG tablet Take 2 tablets (8 mg total) by mouth every 8 (eight) hours as needed.  Qty: 20 tablet, Refills: 0      oxyCODONE-acetaminophen (PERCOCET) 5-325 mg per tablet Take 1 tablet by mouth every 4 (four) hours as needed for Pain.  Qty: 45 tablet, Refills: 0         CONTINUE these medications which have CHANGED    Details   docusate sodium (COLACE) 100 MG capsule Take 1 capsule (100 mg total) by mouth 3 (three) times daily.  Qty: 90 capsule, Refills: 0         CONTINUE these medications which have NOT CHANGED    Details   cholecalciferol, vitamin D3, (VITAMIN D3) 1,000 unit Chew Take 1,000 Units by mouth 2 (two) times daily.      COLLAGEN MISC 4 capsules by Misc.(Non-Drug; Combo Route) route once daily.      cyanocobalamin (VITAMIN B-12) 1000 MCG tablet Take 1,000 mcg by mouth 2 (two) times daily.      Lactobacillus acidophilus (PROBIOTIC ORAL) Take 1 tablet by mouth once daily.         STOP taking these medications       naproxen (NAPROSYN) 500 MG tablet Comments:   Reason for Stopping:         UNABLE TO FIND Comments:   Reason for Stopping:         UNABLE TO FIND Comments:   Reason for Stopping:         UNABLE TO FIND Comments:   Reason for Stopping:         UNABLE TO FIND Comments:   Reason for Stopping:               I certify that this patient is confined to his home and needs intermittent skilled nursing care, physical therapy and  occupational therapy.

## 2019-10-29 NOTE — PLAN OF CARE
Problem: Physical Therapy Goal  Goal: Physical Therapy Goal  Description  Goals to be met by: 11/10/2019    Patient will increase functional independence with mobility by performin. Supine to sit with Set-up [place]  2. Sit to supine with Set-up [place]  3. Sit to stand transfer with Supervision  4. Gait  x 150 feet with Supervision using Rolling Walker.   5. Ascend/descend 3 stair without Handrail(s) and Contact Guard Assistance using Rolling Walker.   6. Lower extremity exercise program x30 reps per handout, with independence   Outcome: Ongoing, Progressing     Patient progressing appropriately towards current goals and plan of care remains appropriate at this time.     Zafar William, PT, DPT  10/29/2019  Pager #: [phone]

## 2019-10-29 NOTE — PROGRESS NOTES
[facility]  Orthopedics  Progress Note    Patient Name: Logan Massey  MRN: [id number]  Admission Date: 10/26/2019  Hospital Length of Stay: 3 days  Attending Provider: MANDA Roger MD  Primary Care Provider: Silvestre Celeste,   Follow-up For: Procedure(s) (LRB):  ARTHROSCOPY, KNEE, WITH DEBRIDEMENT (Left)    Post-Operative Day: 3 Days Post-Op  Subjective:     Principal Problem:Septic arthritis of knee, left    Principal Orthopedic Problem: same    Interval History: Patient seen and examined at bedside.  No acute events overnight.  Pain controlled.  Growing StaphSTAPHYLOCOCCUS LUGDUNENSIS from aspiration 5 days ago. crusied 110 feet with therapy yesterday. BP issues yesterday and today. Nursing note reports paged twice to MD team, but was not aware of pressures. stablethis am.       Review of patient's allergies indicates:  No Known Allergies    Current Facility-Administered Medications   Medication    0.9%  NaCl infusion    0.9%  NaCl infusion    0.9%  NaCl infusion    acetaminophen tablet 650 mg    acetaminophen tablet 650 mg    ceFAZolin (ANCEF) 6 g in dextrose 5 % 500 mL CONTINUOUS INFUSION    docusate sodium capsule 100 mg    enoxaparin injection 40 mg    lidocaine (PF) 10 mg/ml (1%) injection 10 mg    naproxen tablet 500 mg    ondansetron disintegrating tablet 8 mg    ondansetron injection 4 mg    oxyCODONE immediate release tablet 10 mg    oxyCODONE immediate release tablet 5 mg    ramelteon tablet 8 mg    sodium chloride 0.9% flush 10 mL    sodium chloride 0.9% flush 10 mL    And    sodium chloride 0.9% flush 10 mL     Objective:     Vital Signs (Most Recent):  Temp: 97.9 °F (36.6 °C) (10/29/19 0458)  Pulse: 81 (10/29/19 0458)  Resp: 18 (10/29/19 0458)  BP: (!) 161/95 (10/29/19 0458)  SpO2: 95 % (10/29/19 0458) Vital Signs (24h Range):  Temp:  [96 °F (35.6 °C)-98.3 °F (36.8 °C)] 97.9 °F (36.6 °C)  Pulse:  [74-90] 81  Resp:  [17-18] 18  SpO2:  [95 %-100 %] 95 %  BP:  "(136-187)/(80-97) 161/95     Weight: 79.4 kg (175 lb)  Height: 5' 11" (180.3 cm)  Body mass index is 24.41 kg/m².      Intake/Output Summary (Last 24 hours) at 10/29/2019 0606  Last data filed at 10/29/2019 0333  Gross per 24 hour   Intake --   Output 1085 ml   Net -1085 ml       Ortho/SPM Exam    AAOx4  NAD  Reg rate  No increased WOB  Dressing c/d/i  Drain in place with bloody fluid  Polar ice in place  SILT T/SP/DP/[place]/Sa  Motor intact T/SP/DP  WWP extremities  FCDs in place and functioning    Significant Labs:   pending        Significant Imaging: I have reviewed all pertinent imaging results/findings.    Assessment/Plan:     * Septic arthritis of knee, left  69 y.o. male POD3 s/p Left knee I and D 10/26/19     Pain control: multimodal  PT/OT: WBAT LLE in HKB 0-90  DVT PPx: ambulate, FCDs at all times when not ambulating, lovenox  Abx: ancef per ID. Growing Staph lugodensis. picc line placed yesterday  Labs: Hgb stable at 10, WBC 7  Drain: 85     Garay: none     Dispo: f/u PT recs, ancef per ID, will discuss with staff    Leukopenia  69 y.o. male POD1 s/p Left knee I and D 10/26/19    Pain control: multimodal  PT/OT: WBAT LLE in HKB 0-90  DVT PPx: ambulate, FCDs at all times when not ambulating  Abx: postop vanc and rocephin, awaiting ID recs. Growing Staph lugodensis  Labs: Hgb stable, WBC 7  Drain: minimal output    Garay: none    Dispo: f/u PT recs, awaiting ID recs. vanc and rocephin for now.      Elevated BP without diagnosis of hypertension  moniotr BP          Gerald Montesinos MD  Orthopedics  [facility]  "

## 2019-10-29 NOTE — PT/OT/SLP PROGRESS
Physical Therapy Treatment    Patient Name:  Logan Massey   MRN:  [id number]    Recommendations:     Discharge Recommendations:  home with home health   Discharge Equipment Recommendations: bedside commode, walker, rolling   Barriers to discharge: None    Assessment:     Logan Massey is a 69 y.o. male admitted with a medical diagnosis of Septic arthritis of knee, left.  He presents with the following impairments/functional limitations:  weakness, gait instability, decreased ROM, impaired joint extensibility, impaired endurance, decreased lower extremity function, impaired muscle length, impaired functional mobilty, impaired self care skills, pain, orthopedic precautions.      Patient demonstrates good motivation and good activity tolerance secondary appropriate pain control and improving functional mobility.  Patient requires between supervision and contact guard assistance for bed mobility, transfers, ambulation and stair climbing secondary to improving sequencing and independence.  Patient demonstrates good safety and improving ambulation tolerance.  Patient tolerated ambulation of 140 ft x 2 with CGA and rolling walker.  Patient also ascended/descended 1 step x 2 with rolling walker and CGA.  Patient demonstrates good sequencing and safety.  Patient likely only requires 1-2 more acute care visits for training if patient still in hospital.     Rehab Prognosis: Good; patient continues to benefit from acute skilled PT services to address these deficits and reach maximum level of function.  Patient remains most appropriate to discharge to home with home health  Recent Surgery: Procedure(s) (LRB):  ARTHROSCOPY, KNEE, WITH DEBRIDEMENT (Left) 3 Days Post-Op    Plan:     During this hospitalization, patient to be seen 6 x/week to address the identified rehab impairments via gait training, therapeutic activities, therapeutic exercises, neuromuscular re-education and progress toward the following goals:    · Plan of Care  "Expires:  11/26/19    Subjective     Subjective: "I am ready to get home."   Pain/Comfort:  · Pain Rating 1: 4/10  · Location - Side 1: Left  · Location - Orientation 1: generalized  · Location 1: knee  · Pain Addressed 1: Reposition, Pre-medicate for activity, Cessation of Activity  · Pain Rating Post-Intervention 1: 4/10      Objective:     Communicated with RN prior to session.  Patient found up in chair with cryotherapy upon PT entry to room.     General Precautions: Standard, fall   Orthopedic Precautions:RLE weight bearing as tolerated   Braces: Hinged knee brace     Functional Mobility:  · Transfers:     · Sit to Stand:  supervision with rolling walker  · Gait: Patient ambulated 140 ft x 2 with rolling walker and contact guard assistance   · Stairs:  Pt ascended/descended 6" curb step x 2 with Rolling Walker with no handrails with Contact Guard Assistance.       AM-PAC 6 CLICK MOBILITY  Turning over in bed (including adjusting bedclothes, sheets and blankets)?: 4  Sitting down on and standing up from a chair with arms (e.g., wheelchair, bedside commode, etc.): 4  Moving from lying on back to sitting on the side of the bed?: 4  Moving to and from a bed to a chair (including a wheelchair)?: 3  Need to walk in hospital room?: 3  Climbing 3-5 steps with a railing?: 3  Basic Mobility Total Score: 21       Therapeutic Activities and Exercises:  Exercises: Patient performed 1 set(s) of 5 repetitions of  ankle pumps, quad sets, glut sets, heel slides and supine hip abduction for right LE.  Gait training:  Patient required cues for sequencing, step through gait pattern and upright posture to increase independence and safety.  Patient required cues ~ 25% of the time.  Patient continues with increased UE support secondary to some pain with R stance phase.      Patient Education:    Patient educated on bed mobility training, Fall risk, gait training, home safety, Home exercise program and transfer training by " explanation.  Patient was receptive to education and verbalizes understanding.     Patient left up in chair with all lines intact, call button in reach and spouse present.    GOALS:   Multidisciplinary Problems     Physical Therapy Goals        Problem: Physical Therapy Goal    Goal Priority Disciplines Outcome Goal Variances Interventions   Physical Therapy Goal     PT, PT/OT Ongoing, Progressing     Description:  Goals to be met by: 11/10/2019    Patient will increase functional independence with mobility by performin. Supine to sit with Set-up [place]  2. Sit to supine with Set-up [place]  3. Sit to stand transfer with Supervision  4. Gait  x 150 feet with Supervision using Rolling Walker.   5. Ascend/descend 3 stair without Handrail(s) and Contact Guard Assistance using Rolling Walker.   6. Lower extremity exercise program x30 reps per handout, with independence                    Time Tracking:     PT Received On: 10/29/19  PT Start Time: 1130     PT Stop Time: 1155  PT Total Time (min): 25 min     Billable Minutes: Gait Training 25 min    Treatment Type: Treatment  PT/PTA: PT     PTA Visit Number: 0     Zafar William, PT  10/29/2019

## 2019-10-29 NOTE — PT/OT/SLP PROGRESS
Occupational Therapy   Treatment    Name: Logan Massey  MRN: [id number]  Admitting Diagnosis:  Septic arthritis of knee, left  3 Days Post-Op    Recommendations:     Discharge Recommendations: home with home health  Discharge Equipment Recommendations:  bedside commode, walker, rolling  Barriers to discharge:  None    Assessment:     Logan Massey is a 69 y.o. male with a medical diagnosis of Septic arthritis of knee, left.  He presents with the following Performance deficits affecting function are weakness, impaired endurance, impaired self care skills, impaired functional mobilty, gait instability, impaired balance, decreased safety awareness, pain.     Pt motivated for therapy and tolerated session well. Pt. Is SBA in t/f's and ambulation due to weakness and ortho precautions. Pt ambulated ~100 ft SBA with RW to increase activity tolerance required for adls and functional mobility. Pt. Is Supervision in toileting. Pt. Would benefit from  PT following d/c to return to PLOF and decrease burden of care.     Rehab Prognosis:  Good; patient would benefit from acute skilled OT services to address these deficits and reach maximum level of function.       Plan:     Patient to be seen 4 x/week to address the above listed problems via self-care/home management, therapeutic activities, therapeutic exercises  · Plan of Care Expires: 11/27/19  · Plan of Care Reviewed with: patient, spouse    Subjective     Pain/Comfort:  · Pain Rating 1: 2/10  · Location - Side 1: Left  · Location 1: knee  · Pain Addressed 1: Reposition, Cessation of Activity  · Pain Rating Post-Intervention 1: 2/10    Objective:     Communicated with: Rn prior to session.  Patient found up in chair with   upon OT entry to room.    General Precautions: Standard, fall   Orthopedic Precautions:RLE weight bearing as tolerated   Braces:       Occupational Performance:     Bed Mobility:    · Not tested     Functional Mobility/Transfers:  · Patient completed Sit <>  Stand Transfer with stand by assistance  with  rolling walker   · Patient completed Toilet Transfer Stand Pivot technique with stand by assistance with  rolling walker  · Functional Mobility: Pt. Is SBA in t/f's and ambulation due to weakness and ortho precautions. Pt ambulated ~100 ft SBA with RW to increase activity tolerance required for adls and functional mobility. Pt. Is Supervision in toileting. Pt. Would benefit from  PT following d/c to return to PLOF and decrease burden of care.     Activities of Daily Living:  · Grooming: stand by assistance standing at sink  · Upper Body Dressing: contact guard assistance jessy gown as robe  · Toileting: supervision seated on toilet      [facility] 6 Click ADL: 19    Treatment & Education:  - OT/POC-  - Importance of mobility to maximize recovery  - Educated on recommended AE to facilitate recovery  - safety w/ functional mobility; hand placement to ensure safe transfers to various surfaces in prep for ADLs  - Reviewed gait sequence and RW management via verbalization and demonstration         Patient left up in chair with all lines intact, call button in reach, RN notified and spouse presentEducation:      GOALS:   Multidisciplinary Problems     Occupational Therapy Goals        Problem: Occupational Therapy Goal    Goal Priority Disciplines Outcome Interventions   Occupational Therapy Goal     OT, PT/OT Ongoing, Progressing    Description:  Goals to be met by: 11/17/2019     Patient will increase functional independence with ADLs by performing:    Feeding with Modified [place].  UE Dressing with Modified [place].  LE Dressing with Set-up Assistance.  Grooming while standing with Supervision.  Toileting from toilet with Supervision for hygiene and clothing management.   Bathing from  sitting at sink with Supervision.  Supine to sit with Modified [place].  Step transfer with Supervision  Toilet transfer to toilet with Supervision.                      Time  Tracking:     OT Date of Treatment: 10/29/19  OT Start Time: 0923  OT Stop Time: 0951  OT Total Time (min): 28 min    Billable Minutes:Self Care/Home Management 14  Therapeutic Activity 14    Mary Salamanca OT  10/29/2019

## 2019-10-29 NOTE — PLAN OF CARE
Referral for IV abx w/ [facility] and liaison has been to bedside for teaching. Pt does not have part D for med coverage. Jeannie w/ [facility] states that [place] h[place] agencies sometimes provide med assistance and will assist in process. Pt w/ referral to [facility] h/h but they declined b/c they were not in contract w/ [facility]. Jeannie suggested [facility] as they are in contract w/ [facility] - pt is in agreement. Referral office at [facility] is handling h/h referral. Pt had drained removed and is ready for d/c but will not be ready until med cost/arrangements are secured - likely later today vs tmw. CM updated MD on d/c delays.     10/29/19 4072   Discharge Reassessment   Assessment Type Discharge Planning Reassessment   Discharge Plan A Home with family;Home Health   Anticipated Discharge Disposition Home-Health   Can the patient answer the patient profile reliably? Yes, cognitively intact   Post-Acute Status   Post-Acute Authorization Home Health/Hospice   Home Health/Hospice Status Referrals Sent   Discharge Delays (!) Medication Delay (Cost, Insurance, Delivery)

## 2019-10-30 VITALS
SYSTOLIC BLOOD PRESSURE: 153 MMHG | DIASTOLIC BLOOD PRESSURE: 82 MMHG | RESPIRATION RATE: 18 BRPM | WEIGHT: 175 LBS | HEART RATE: 77 BPM | OXYGEN SATURATION: 94 % | HEIGHT: 71 IN | BODY MASS INDEX: 24.5 KG/M2 | TEMPERATURE: 96 F

## 2019-10-30 PROCEDURE — 25000003 PHARM REV CODE 250: Performed by: STUDENT IN AN ORGANIZED HEALTH CARE EDUCATION/TRAINING PROGRAM

## 2019-10-30 PROCEDURE — 97535 SELF CARE MNGMENT TRAINING: CPT

## 2019-10-30 PROCEDURE — 97116 GAIT TRAINING THERAPY: CPT

## 2019-10-30 PROCEDURE — 97530 THERAPEUTIC ACTIVITIES: CPT

## 2019-10-30 PROCEDURE — 97110 THERAPEUTIC EXERCISES: CPT

## 2019-10-30 RX ORDER — LABETALOL HYDROCHLORIDE 5 MG/ML
10 INJECTION, SOLUTION INTRAVENOUS ONCE
Status: COMPLETED | OUTPATIENT
Start: 2019-10-30 | End: 2019-10-30

## 2019-10-30 RX ADMIN — ASPIRIN 81 MG: 81 TABLET, COATED ORAL at 08:10

## 2019-10-30 RX ADMIN — OXYCODONE HYDROCHLORIDE 5 MG: 5 TABLET ORAL at 12:10

## 2019-10-30 RX ADMIN — OXYCODONE HYDROCHLORIDE 5 MG: 5 TABLET ORAL at 08:10

## 2019-10-30 RX ADMIN — DOCUSATE SODIUM 100 MG: 100 CAPSULE, LIQUID FILLED ORAL at 08:10

## 2019-10-30 RX ADMIN — LABETALOL HCL IV SOLN PREFILLED SYRINGE 20 MG/4ML (5 MG/ML) 10 MG: 20/4 SOLUTION PREFILLED SYRINGE at 06:10

## 2019-10-30 RX ADMIN — NAPROXEN 500 MG: 500 TABLET ORAL at 08:10

## 2019-10-30 NOTE — PLAN OF CARE
Problem: Physical Therapy Goal  Goal: Physical Therapy Goal  Description  Goals to be met by: 11/10/2019    Patient will increase functional independence with mobility by performin. Supine to sit with Set-up [place] - met 10/30/2019  1a. Mod I  2. Sit to supine with Set-up [place] - met 10/30/2019  2a. Mod I  3. Sit to stand transfer with Supervision - met 10/30/2019  3a. Mod I  4. Gait  x 150 feet with Supervision using Rolling Walker. - not met  5. Ascend/descend 3 stair without Handrail(s) and Contact Guard Assistance using Rolling Walker. - not met  6. Lower extremity exercise program x30 reps per handout, with independence - not met    10/30/2019 0953 by Sarina Varela, [facility]  Outcome: Ongoing, Progressing    Goals adjusted and are appropriate. Sarina Varela 10/30/2019

## 2019-10-30 NOTE — ASSESSMENT & PLAN NOTE
69 y.o. male POD4 s/p Left knee I and D 10/26/19     Pain control: multimodal  PT/OT: WBAT LLE in HKB 0-90  DVT PPx: ambulate, FCDs at all times when not ambulating, aspirin 81 bid  Abx: ancef per ID. Growing Staph lugodensis. picc line placed yesterday  Labs: Hgb stable at 10, WBC 7  Drain : none     Gaary: none     Dispo: f/u PT recs, ancef per ID, awaiting insurance approval of abx. Plan for DC today. Patient needs to fu with PCP regarding elevated pressures

## 2019-10-30 NOTE — DISCHARGE SUMMARY
[facility]  Orthopedics  Discharge Summary      Patient Name: Logan Massey  MRN: [id number]  Admission Date: 10/26/2019  Hospital Length of Stay: 4 days  Discharge Date and Time: No discharge date for patient encounter.  Attending Physician: MANDA Roger MD   Discharging Provider: Sandra Montesinos MD  Primary Care Provider: Silvestre Celeste DO    HPI: Pt is a 69 y.o. male with PMH left knee arthritis, G6PD deficiency, and prostate cancer who presents with swelling and pain of the left knee. Pt with knee pain over the past two months with recurrent swelling after aspirations.   He presented to orthopedics clinic 2 days ago where the knee was aspirated again. Aspiration showed-7,420 WBCs 96% segs.  Cultures from this aspiration are growing Staph Lugdunensis (susceptibility pending). He denies any fevers, recent illnesses, or recent procedures/dental work. He presents to [facility] for surgery today to treat his infection.     Procedure(s) (LRB):  ARTHROSCOPY, KNEE, WITH DEBRIDEMENT (Left)      Hospital Course: the patient arrived to pre-op area for proper pre-operative management.  Upon completion of pre-operative preparation, the patient was taken back to the operative theatre.  A Left knee I and D was performed without complication and the patient was transported to the post anesthesia care unit in stable condition. After appropriate recovery from the anaesthetic agents used during the surgery the patient was transferred to the floor where they received pain medication and physical therapy. The patient then obtained a picc line and after insurance approved coverage of medications,  they were discharged home on POD 4          Consults (From admission, onward)        Status Ordering Provider     Inpatient consult to Infectious Diseases  Once     Provider:  (Not yet assigned)    SANDRA Almodovar     Inpatient consult to PICC team (NIAS)  Once     Provider:  (Not yet assigned)     "Completed SANDRA JENKINS          Significant Diagnostic Studies: Labs:   CMP   Recent Labs   Lab 10/29/19  0614   *   K 4.2      CO2 29      BUN 11   CREATININE 0.8   CALCIUM 9.4   PROT 6.7   ALBUMIN 2.7*   BILITOT 0.5   ALKPHOS 61   AST 31   ALT 46*   ANIONGAP 6*   ESTGFRAFRICA >60.0   EGFRNONAA >60.0    and CBC   Recent Labs   Lab 10/29/19  0614   WBC 5.86   HGB 11.4*   HCT 37.1*   *       Pending Diagnostic Studies:     None        Final Active Diagnoses:    Diagnosis Date Noted POA    PRINCIPAL PROBLEM:  Septic arthritis of knee, left [M00.9] 10/26/2019 Yes    Leukopenia [D72.819] 08/20/2019 Yes    Elevated BP without diagnosis of hypertension [R03.0] 08/20/2019 Yes      Problems Resolved During this Admission:      Discharged Condition: stable    Disposition: Home or Self Care    Follow Up:  Follow-up Information     Jose Antonio Roger MD In 2 weeks.    Specialties:  Sports Medicine, Orthopedic Surgery  Why:  For wound re-check  Contact information:  [address] [facility]  1ST FLOOR [facility] [address] [address]  [phone]                 Patient Instructions:      COMMODE FOR HOME USE     Order Specific Question Answer Comments   Type: Standard    Height: 5' 11" (1.803 m)    Weight: 79.4 kg (175 lb)    Does patient have medical equipment at home? cane, straight    Length of need (1-99 months): 99    Vendor: Other (use comments)    Expected Date of Delivery: 10/29/2019      WALKER FOR HOME USE     Order Specific Question Answer Comments   Type of Walker: Adult (5'4"-6'6")    With wheels? No    Height: 5' 11" (1.803 m)    Weight: 79.4 kg (175 lb)    Length of need (1-99 months): 99    Does patient have medical equipment at home? cane, straight    Please check all that apply: Patient's condition impairs ambulation.    Vendor: [facility]    Expected Date of Delivery: 10/29/2019      Medications:  Reconciled Home Medications:      Medication List      START " taking these medications    aspirin 81 MG EC tablet  Commonly known as:  ECOTRIN  Take 1 tablet (81 mg total) by mouth 2 (two) times daily.     ondansetron 4 MG tablet  Commonly known as:  ZOFRAN  Take 2 tablets (8 mg total) by mouth every 8 (eight) hours as needed.     oxyCODONE-acetaminophen 5-325 mg per tablet  Commonly known as:  PERCOCET  Take 1 tablet by mouth every 4 (four) hours as needed for Pain.        CHANGE how you take these medications    STOOL SOFTENER 100 MG capsule  Generic drug:  docusate sodium  Take 1 capsule (100 mg total) by mouth 3 (three) times daily.  What changed:  when to take this        CONTINUE taking these medications    COLLAGEN MISC  4 capsules by Misc.(Non-Drug; Combo Route) route once daily.     cyanocobalamin 1000 MCG tablet  Commonly known as:  VITAMIN B-12  Take 1,000 mcg by mouth 2 (two) times daily.     PROBIOTIC ORAL  Take 1 tablet by mouth once daily.     VITAMIN D3 1,000 unit Chew  Generic drug:  cholecalciferol (vitamin D3)  Take 1,000 Units by mouth 2 (two) times daily.        STOP taking these medications    naproxen 500 MG tablet  Commonly known as:  NAPROSYN     UNABLE TO FIND            Gerald Montesinos MD  Orthopedics  [facility]

## 2019-10-30 NOTE — PLAN OF CARE
Pt motivated for therapy and tolerated session well. Pt is SBA in adls and functional mobility due to fatigue. Pt. Ambulated ~150 ft SBA with RW to increase activity tolerance required for adls and functional mobility. Pt. Required SBA in toileting and grooming. Pt. Is safe to d/c home with home health PT.      Problem: Occupational Therapy Goal  Goal: Occupational Therapy Goal  Description  Goals to be met by: 11/17/2019     Patient will increase functional independence with ADLs by performing:    Feeding with Modified [place].  UE Dressing with Modified [place].  LE Dressing with Set-up Assistance.  Grooming while standing with Supervision.  Toileting from toilet with Supervision for hygiene and clothing management.   Bathing from  sitting at sink with Supervision.  Supine to sit with Modified [place].  Step transfer with Supervision  Toilet transfer to toilet with Supervision.     Outcome: Ongoing, Progressing

## 2019-10-30 NOTE — PT/OT/SLP PROGRESS
Occupational Therapy   Treatment/Discharge summary    Name: Logan Massey  MRN: [id number]  Admitting Diagnosis:  Septic arthritis of knee, left  4 Days Post-Op    Recommendations:     Discharge Recommendations: home health PT  Discharge Equipment Recommendations:  walker, rolling  Barriers to discharge:  None    Assessment:     Logan Massey is a 69 y.o. male with a medical diagnosis of Septic arthritis of knee, left.  He presents with the following Performance deficits affecting function are weakness, impaired endurance, impaired self care skills, impaired functional mobilty, gait instability, impaired balance, decreased safety awareness, pain.     Pt motivated for therapy and tolerated session well. Pt is SBA in adls and functional mobility due to fatigue. Pt. Ambulated ~150 ft SBA with RW to increase activity tolerance required for adls and functional mobility. Pt. Required SBA in toileting and grooming. Pt. Is safe to d/c home with home health PT.    Rehab Prognosis:  Good; patient would benefit from acute skilled OT services to address these deficits and reach maximum level of function.       Plan:     Patient to be seen 4 x/week to address the above listed problems via self-care/home management, therapeutic activities, therapeutic exercises  · Plan of Care Expires: 11/27/19  · Plan of Care Reviewed with: patient, spouse    Subjective     Pain/Comfort:  · Pain Rating 1: 2/10  · Location - Side 1: Left  · Location 1: knee  · Pain Addressed 1: Reposition, Pre-medicate for activity    Objective:     Communicated with: Rn prior to session.  Patient found supine with peripheral IV, SCD, cryotherapy upon OT entry to room.    General Precautions: Standard, fall   Orthopedic Precautions:RLE weight bearing as tolerated   Braces: Hinged knee brace     Occupational Performance:     Bed Mobility:    · Not tested     Functional Mobility/Transfers:  · Patient completed Sit <> Stand Transfer with stand by assistance  with   rolling walker   · Patient completed Toilet Transfer Stand Pivot technique with stand by assistance with  rolling walker  · Functional Mobility: Pt motivated for therapy and tolerated session well. Pt is SBA in adls and functional mobility due to fatigue. Pt. Ambulated ~150 ft SBA with RW to increase activity tolerance required for adls and functional mobility. Pt. Required SBA in toileting and grooming. Pt. Is safe to d/c home with home health PT.    Activities of Daily Living:  · Toileting: stand by assistance for t/f      [facility] 6 Click ADL: 22    Treatment & Education:  - OT/POC-  - Importance of mobility to maximize recovery.  - Educated pt and spouse on cryotherapy   - Pt. Verbalized understanding and demonstrated ability to jessy hinged knee brace  - safety w/ functional mobility; hand placement to ensure safe transfers to various surfaces in prep for ADLs  - Reviewed gait sequence and RW management via verbalization and demonstration   - encouraged to ambulate within household environment at least every hour to hour 1/2      Patient left up in chair with all lines intact, call button in reach, RN notified and spouse presentEducation:      GOALS:   Multidisciplinary Problems     Occupational Therapy Goals        Problem: Occupational Therapy Goal    Goal Priority Disciplines Outcome Interventions   Occupational Therapy Goal     OT, PT/OT Ongoing, Progressing    Description:  Goals to be met by: 11/17/2019     Patient will increase functional independence with ADLs by performing:    Feeding with Modified [place].  UE Dressing with Modified [place].  LE Dressing with Set-up Assistance.  Grooming while standing with Supervision.  Toileting from toilet with Supervision for hygiene and clothing management.   Bathing from  sitting at sink with Supervision.  Supine to sit with Modified [place].  Step transfer with Supervision  Toilet transfer to toilet with Supervision.                      Time  Tracking:     OT Date of Treatment: 10/30/19  OT Start Time: 1105  OT Stop Time: 1130  OT Total Time (min): 25 min    Billable Minutes:Self Care/Home Management 15  Therapeutic Activity 10    Mary Salamanca OT  10/30/2019

## 2019-10-30 NOTE — PT/OT/SLP PROGRESS
Physical Therapy Treatment    Patient Name:  Logan Massey   MRN:  [id number]    Recommendations:     Discharge Recommendations:  outpatient PT   Discharge Equipment Recommendations: walker, rolling   Barriers to discharge: None    Assessment:     Logan Massey is a 69 y.o. male admitted with a medical diagnosis of Septic arthritis of knee, left.  He presents with the following impairments/functional limitations:  weakness, impaired endurance, impaired functional mobilty, gait instability, impaired balance. Pt andrew treatment well being able to perform bed mobility and transfers with supervision as well as andrew gait training with SBA using a RW. Pt also andrew LE exercises well with assistance as needed. Pt noted his FELIPE at home are designed with a landing between each step. Pt is s/p septic arthritis and knee arthroscopy (10/26).    Rehab Prognosis: Good; patient would benefit from acute skilled PT services to address these deficits and reach maximum level of function.    Recent Surgery: Procedure(s) (LRB):  ARTHROSCOPY, KNEE, WITH DEBRIDEMENT (Left) 4 Days Post-Op    Plan:     During this hospitalization, patient to be seen daily to address the identified rehab impairments via gait training, therapeutic activities, therapeutic exercises and progress toward the following goals:    · Plan of Care Expires:  11/26/19    Subjective     Chief Complaint: pt c/o pain  Patient/Family Comments/goals: to get better and go home  Pain/Comfort:  · Pain Rating 1: 2/10  · Location - Side 1: Left  · Location 1: knee  · Pain Rating Post-Intervention 1: 2/10      Objective:     Communicated with nurse prior to session.  Patient found up in chair with peripheral IV, SCD, cryotherapy(amada hose) upon PT entry to room.     General Precautions: Standard, fall   Orthopedic Precautions:RLE weight bearing as tolerated   Braces: Hinged knee brace locked 0-90deg    Functional Mobility:  · Bed Mobility:   Pt donned HKB prior to PT arrival  · Rolling  Left:  supervision  · Supine to Sit: supervision  ·   · Transfers:     · Sit to Stand:  stand by assistance with rolling walker and HKB  ·   · Gait: pt andrew ~140ft x 2 of gait training with RW, HKB, and SBA for IV pole management.  ·   · Stairs:  Not attempted per pt's report his stairs are designed similarly to a curb. Pt completed curb navigation in prior PT session      AM-PAC 6 CLICK MOBILITY  Turning over in bed (including adjusting bedclothes, sheets and blankets)?: 3  Sitting down on and standing up from a chair with arms (e.g., wheelchair, bedside commode, etc.): 3  Moving from lying on back to sitting on the side of the bed?: 3  Moving to and from a bed to a chair (including a wheelchair)?: 3  Need to walk in hospital room?: 3  Climbing 3-5 steps with a railing?: 3  Basic Mobility Total Score: 18       Therapeutic Activities and Exercises:  Pt performed LLE exercises with HKB x 10 repetitions and provided written handout. Pt verbalized understanding of exercises  PT readjusted pt's HKB and educated pt on proper positioning of brace. Pt verbalized comprehension.    Patient left up in chair with all lines intact and call button in reach..    GOALS:   Multidisciplinary Problems     Physical Therapy Goals        Problem: Physical Therapy Goal    Goal Priority Disciplines Outcome Goal Variances Interventions   Physical Therapy Goal     PT, PT/OT Ongoing, Progressing     Description:  Goals to be met by: 11/10/2019    Patient will increase functional independence with mobility by performin. Supine to sit with Set-up [place] - met 10/30/2019  1a. Mod I  2. Sit to supine with Set-up [place] - met 10/30/2019  2a. Mod I  3. Sit to stand transfer with Supervision - met 10/30/2019  3a. Mod I  4. Gait  x 150 feet with Supervision using Rolling Walker. - not met  5. Ascend/descend 3 stair without Handrail(s) and Contact Guard Assistance using Rolling Walker. - not met  6. Lower extremity exercise program  x30 reps per handout, with independence - not met                     Time Tracking:     PT Received On: 10/30/19  PT Start Time: 0848     PT Stop Time: 0935  PT Total Time (min): 47 min     Billable Minutes: Gait Training 15 min and Therapeutic Exercise 32 min    Treatment Type: Treatment  PT/PTA: PT     PTA Visit Number: 0     Sarina Varela, SPT  10/30/2019

## 2019-10-30 NOTE — PT/OT/SLP DISCHARGE
Occupational Therapy Discharge Summary    Logan Massey  MRN: [id number]   Principal Problem: Septic arthritis of knee, left      Patient Discharged from acute Occupational Therapy on 10/30/19.  Please refer to prior OT note dated 10/30/19 for functional status.    Assessment:      Goals partially met.    Objective:     GOALS:   Multidisciplinary Problems     Occupational Therapy Goals        Problem: Occupational Therapy Goal    Goal Priority Disciplines Outcome Interventions   Occupational Therapy Goal     OT, PT/OT Ongoing, Progressing    Description:  Goals to be met by: 11/17/2019     Patient will increase functional independence with ADLs by performing:    Feeding with Modified [place].  UE Dressing with Modified [place].  LE Dressing with Set-up Assistance.  Grooming while standing with Supervision.  Toileting from toilet with Supervision for hygiene and clothing management.   Bathing from  sitting at sink with Supervision.  Supine to sit with Modified [place].  Step transfer with Supervision  Toilet transfer to toilet with Supervision.                      Reasons for Discontinuation of Therapy Services  Satisfactory goal achievement.      Plan:     Patient Discharged to: Home with Home Health Service    Mary Salamanca, NAGA  10/30/2019

## 2019-10-30 NOTE — PLAN OF CARE
CM received update from [facility] that IV abx contract and pt's pricing has all been finalized. Pt's spouse will notify [facility] when they leave and the IV med will be delivered to pt home. [facility] will meet pt at home today and connect pt to IV abx and reinforce teaching. Pt is ready for d/c from a case mgmt standpoint and CM updated pt's nurse.     10/30/19 1150   Final Note   Assessment Type Final Discharge Note   Anticipated Discharge Disposition Home-Health   Right Care Referral Info   Post Acute Recommendation Home-care   Facility Name [facility] and [facility]

## 2019-10-30 NOTE — PROGRESS NOTES
[facility]  Orthopedics  Progress Note    Patient Name: Logan Massey  MRN: [id number]  Admission Date: 10/26/2019  Hospital Length of Stay: 4 days  Attending Provider: MANDA Roger MD  Primary Care Provider: Silvestre Celeste DO  Follow-up For: Procedure(s) (LRB):  ARTHROSCOPY, KNEE, WITH DEBRIDEMENT (Left)    Post-Operative Day: 4 Days Post-Op  Subjective:     Principal Problem:Septic arthritis of knee, left    Principal Orthopedic Problem: same    Interval History: Patient seen and examined at bedside.  No acute events overnight.  Pain controlled.  Growing StaphSTAPHYLOCOCCUS LUGDUNENSIS from aspiration 5 days ago. crusied 110 feet with therapy yesterday.continues to have BP issues, up to 160s mannually this am, 10 mg labetalol ordered (heart rate in the 90s)    Review of patient's allergies indicates:  No Known Allergies    Current Facility-Administered Medications   Medication    0.9%  NaCl infusion    0.9%  NaCl infusion    0.9%  NaCl infusion    acetaminophen tablet 650 mg    acetaminophen tablet 650 mg    aspirin EC tablet 81 mg    ceFAZolin (ANCEF) 6 g in dextrose 5 % 500 mL CONTINUOUS INFUSION    docusate sodium capsule 100 mg    labetalol injection 10 mg    lidocaine (PF) 10 mg/ml (1%) injection 10 mg    naproxen tablet 500 mg    ondansetron disintegrating tablet 8 mg    ondansetron injection 4 mg    oxyCODONE immediate release tablet 10 mg    oxyCODONE immediate release tablet 5 mg    ramelteon tablet 8 mg    sodium chloride 0.9% flush 10 mL    sodium chloride 0.9% flush 10 mL    And    sodium chloride 0.9% flush 10 mL     Objective:     Vital Signs (Most Recent):  Temp: 98.3 °F (36.8 °C) (10/30/19 0524)  Pulse: 93 (10/30/19 0524)  Resp: 18 (10/30/19 0524)  BP: (!) 164/100 (10/30/19 0547)  SpO2: 98 % (10/30/19 0524) Vital Signs (24h Range):  Temp:  [96 °F (35.6 °C)-98.5 °F (36.9 °C)] 98.3 °F (36.8 °C)  Pulse:  [70-93] 93  Resp:  [16-18] 18  SpO2:  [96 %-99 %] 98  Problem: Patient Care Overview  Goal: Plan of Care Review  Outcome: Ongoing (interventions implemented as appropriate)  POC reviewed with Tommy Way and family at 1700. Pt shows no indications of understanding. Family verbalized understanding. Questions and concerns addressed. Pt got agitated and pulled out IV lines; pt now on Precedex infusion. No other acute events today. Pt progressing toward goals. Will continue to monitor. See flowsheets for full assessment and VS info.          "%  BP: (152-177)/() 164/100     Weight: 79.4 kg (175 lb)  Height: 5' 11" (180.3 cm)  Body mass index is 24.41 kg/m².      Intake/Output Summary (Last 24 hours) at 10/30/2019 0603  Last data filed at 10/30/2019 0400  Gross per 24 hour   Intake 600 ml   Output 1900 ml   Net -1300 ml       Ortho/SPM Exam    AAOx4  NAD  Reg rate  No increased WOB  Dressing c/d/i  Polar ice in place  SILT T/SP/DP/[place]/Sa  Motor intact T/SP/DP  WWP extremities  FCDs in place and functioning    Significant Labs:   pending        Significant Imaging: I have reviewed all pertinent imaging results/findings.    Assessment/Plan:     * Septic arthritis of knee, left  69 y.o. male POD4 s/p Left knee I and D 10/26/19     Pain control: multimodal  PT/OT: WBAT LLE in HKB 0-90  DVT PPx: ambulate, FCDs at all times when not ambulating, aspirin 81 bid  Abx: ancef per ID. Growing Staph lugodensis. picc line placed yesterday  Labs: Hgb stable at 10, WBC 7  Drain : none     [place]: none     Dispo: f/u PT recs, ancef per ID, awaiting insurance approval of abx. Plan for DC today. Patient needs to fu with PCP regarding elevated pressures    Leukopenia  69 y.o. male POD1 s/p Left knee I and D 10/26/19    Pain control: multimodal  PT/OT: WBAT LLE in HKB 0-90  DVT PPx: ambulate, FCDs at all times when not ambulating  Abx: postop vanc and rocephin, awaiting ID recs. Growing Staph lugodensis  Labs: Hgb stable, WBC 7  Drain: minimal output    [place]ay: none    Dispo: f/u PT recs, awaiting ID recs. vanc and rocephin for now.      Elevated BP without diagnosis of hypertension  moniotr BP          Gerald Montesinos MD  Orthopedics  [facility]  "

## 2019-10-30 NOTE — SUBJECTIVE & OBJECTIVE
"Principal Problem:Septic arthritis of knee, left    Principal Orthopedic Problem: same    Interval History: Patient seen and examined at bedside.  No acute events overnight.  Pain controlled.  Growing StaphSTAPHYLOCOCCUS EBENIS from aspiration 5 days ago. crusied 110 feet with therapy yesterday.continues to have BP issues, up to 160s mannually this am, 10 mg labetalol ordered (heart rate in the 90s)    Review of patient's allergies indicates:  No Known Allergies    Current Facility-Administered Medications   Medication    0.9%  NaCl infusion    0.9%  NaCl infusion    0.9%  NaCl infusion    acetaminophen tablet 650 mg    acetaminophen tablet 650 mg    aspirin EC tablet 81 mg    ceFAZolin (ANCEF) 6 g in dextrose 5 % 500 mL CONTINUOUS INFUSION    docusate sodium capsule 100 mg    labetalol injection 10 mg    lidocaine (PF) 10 mg/ml (1%) injection 10 mg    naproxen tablet 500 mg    ondansetron disintegrating tablet 8 mg    ondansetron injection 4 mg    oxyCODONE immediate release tablet 10 mg    oxyCODONE immediate release tablet 5 mg    ramelteon tablet 8 mg    sodium chloride 0.9% flush 10 mL    sodium chloride 0.9% flush 10 mL    And    sodium chloride 0.9% flush 10 mL     Objective:     Vital Signs (Most Recent):  Temp: 98.3 °F (36.8 °C) (10/30/19 0524)  Pulse: 93 (10/30/19 0524)  Resp: 18 (10/30/19 0524)  BP: (!) 164/100 (10/30/19 0547)  SpO2: 98 % (10/30/19 0524) Vital Signs (24h Range):  Temp:  [96 °F (35.6 °C)-98.5 °F (36.9 °C)] 98.3 °F (36.8 °C)  Pulse:  [70-93] 93  Resp:  [16-18] 18  SpO2:  [96 %-99 %] 98 %  BP: (152-177)/() 164/100     Weight: 79.4 kg (175 lb)  Height: 5' 11" (180.3 cm)  Body mass index is 24.41 kg/m².      Intake/Output Summary (Last 24 hours) at 10/30/2019 0603  Last data filed at 10/30/2019 0400  Gross per 24 hour   Intake 600 ml   Output 1900 ml   Net -1300 ml       Ortho/SPM Exam    AAOx4  NAD  Reg rate  No increased WOB  Dressing c/d/i  Polar ice in " place  SILT T/SP/DP/[place]/Sa  Motor intact T/SP/DP  WWP extremities  FCDs in place and functioning    Significant Labs:   pending        Significant Imaging: I have reviewed all pertinent imaging results/findings.

## 2019-10-30 NOTE — PROGRESS NOTES
Awake, alert, and oriented X4. Pt and wife verbalized understanding of discharge instructions per MD verbally. Pt discharged with knee immobilizer, polar ice, and Right upper arm PICC clean, dry, and intact.

## 2019-10-31 LAB
BACTERIA SPEC ANAEROBE CULT: NORMAL
BACTERIA SPEC ANAEROBE CULT: NORMAL

## 2019-11-04 ENCOUNTER — TELEPHONE (OUTPATIENT)
Dept: INFECTIOUS DISEASES | Facility: CLINIC | Age: 69
End: 2019-11-04

## 2019-11-04 LAB
BUN BLD-MCNC: 10 MG/DL (ref 4–21)
CREATININE: 0.67 MG/DL (ref 0.6–1.3)
CRP: 69.5 MG/L
GLUCOSE: 108 MG/DL (ref 70–110)
HEMATOCRIT BLOOD: 33.8 % (ref 42–52)
HGB BLD-MCNC: 11.2 G/DL (ref 13.5–17.5)
PLATELET COUNT: 419 BIL/L (ref 150–400)
POTASSIUM: 4.2 MMOL/L (ref 3.5–5.1)
SED RATE (WESTERGREN): 66 MM/HR (ref 0–20.2)
SODIUM: 135 MMOL/L (ref 135–145)
WBC: 6.4 BIL/L (ref 4.8–10.8)

## 2019-11-04 NOTE — TELEPHONE ENCOUNTER
Labs faxed over from [facility] collected on 11/04/2019. Manually entered into system and scanned into .

## 2019-11-11 ENCOUNTER — TELEPHONE (OUTPATIENT)
Dept: INFECTIOUS DISEASES | Facility: CLINIC | Age: 69
End: 2019-11-11

## 2019-11-11 LAB
BUN BLD-MCNC: 7 MG/DL (ref 4–21)
CREATININE: 0.69 MG/DL (ref 0.6–1.3)
CRP: 43.2 MG/L
GLUCOSE: 111 MG/DL (ref 70–110)
HEMATOCRIT BLOOD: 32.7 % (ref 42–52)
HGB BLD-MCNC: 10.8 G/DL (ref 13.5–17.5)
PLATELET COUNT: 314 BIL/L (ref 150–400)
POTASSIUM: 3.9 MMOL/L (ref 3.5–5.1)
SED RATE (WESTERGREN): 87 MM/HR (ref 0–20.2)
SODIUM: 138 MMOL/L (ref 135–145)
WBC: 8.5 BIL/L (ref 4.8–10.8)

## 2019-11-11 NOTE — TELEPHONE ENCOUNTER
Labs faxed over from [facility] collected on 11/11/2019. Manually entered labs into system and scanned into .

## 2019-11-12 ENCOUNTER — OFFICE VISIT (OUTPATIENT)
Dept: SPORTS MEDICINE | Facility: CLINIC | Age: 69
End: 2019-11-12
Payer: MEDICARE

## 2019-11-12 ENCOUNTER — OFFICE VISIT (OUTPATIENT)
Dept: INFECTIOUS DISEASES | Facility: CLINIC | Age: 69
End: 2019-11-12
Payer: MEDICARE

## 2019-11-12 ENCOUNTER — TELEPHONE (OUTPATIENT)
Dept: SPORTS MEDICINE | Facility: CLINIC | Age: 69
End: 2019-11-12

## 2019-11-12 VITALS
BODY MASS INDEX: 23.84 KG/M2 | DIASTOLIC BLOOD PRESSURE: 91 MMHG | HEART RATE: 92 BPM | HEIGHT: 72 IN | WEIGHT: 176 LBS | SYSTOLIC BLOOD PRESSURE: 160 MMHG

## 2019-11-12 VITALS
HEIGHT: 72 IN | WEIGHT: 176.13 LBS | HEART RATE: 98 BPM | SYSTOLIC BLOOD PRESSURE: 156 MMHG | TEMPERATURE: 98 F | DIASTOLIC BLOOD PRESSURE: 89 MMHG | BODY MASS INDEX: 23.86 KG/M2

## 2019-11-12 DIAGNOSIS — M23.92 INTERNAL DERANGEMENT OF LEFT KNEE: ICD-10-CM

## 2019-11-12 DIAGNOSIS — Z09 SURGERY FOLLOW-UP EXAMINATION: ICD-10-CM

## 2019-11-12 DIAGNOSIS — R05.9 COUGH: ICD-10-CM

## 2019-11-12 DIAGNOSIS — M00.9 PYOGENIC ARTHRITIS OF LEFT KNEE JOINT, DUE TO UNSPECIFIED ORGANISM: Primary | ICD-10-CM

## 2019-11-12 DIAGNOSIS — M00.062 STAPHYLOCOCCAL ARTHRITIS OF LEFT KNEE: Primary | ICD-10-CM

## 2019-11-12 PROCEDURE — 99999 PR PBB SHADOW E&M-EST. PATIENT-LVL IV: CPT | Mod: PBBFAC,,, | Performed by: CLINICAL NURSE SPECIALIST

## 2019-11-12 PROCEDURE — 99024 POSTOP FOLLOW-UP VISIT: CPT | Mod: POP,,, | Performed by: PHYSICIAN ASSISTANT

## 2019-11-12 PROCEDURE — 99214 OFFICE O/P EST MOD 30 MIN: CPT | Mod: S$PBB,,, | Performed by: CLINICAL NURSE SPECIALIST

## 2019-11-12 PROCEDURE — 99024 PR POST-OP FOLLOW-UP VISIT: ICD-10-PCS | Mod: POP,,, | Performed by: PHYSICIAN ASSISTANT

## 2019-11-12 PROCEDURE — 99999 PR PBB SHADOW E&M-EST. PATIENT-LVL III: ICD-10-PCS | Mod: PBBFAC,,, | Performed by: PHYSICIAN ASSISTANT

## 2019-11-12 PROCEDURE — 99213 OFFICE O/P EST LOW 20 MIN: CPT | Mod: PBBFAC,27 | Performed by: PHYSICIAN ASSISTANT

## 2019-11-12 PROCEDURE — 99214 OFFICE O/P EST MOD 30 MIN: CPT | Mod: PBBFAC | Performed by: CLINICAL NURSE SPECIALIST

## 2019-11-12 PROCEDURE — 99999 PR PBB SHADOW E&M-EST. PATIENT-LVL IV: ICD-10-PCS | Mod: PBBFAC,,, | Performed by: CLINICAL NURSE SPECIALIST

## 2019-11-12 PROCEDURE — 99999 PR PBB SHADOW E&M-EST. PATIENT-LVL III: CPT | Mod: PBBFAC,,, | Performed by: PHYSICIAN ASSISTANT

## 2019-11-12 PROCEDURE — 99214 PR OFFICE/OUTPT VISIT, EST, LEVL IV, 30-39 MIN: ICD-10-PCS | Mod: S$PBB,,, | Performed by: CLINICAL NURSE SPECIALIST

## 2019-11-12 RX ORDER — OXYCODONE AND ACETAMINOPHEN 5; 325 MG/1; MG/1
1 TABLET ORAL EVERY 6 HOURS PRN
Qty: 28 TABLET | Refills: 0 | Status: SHIPPED | OUTPATIENT
Start: 2019-11-12

## 2019-11-12 NOTE — LETTER
November 12, 2019      Gerald Montesinos MD  [address]  [facility] [address]           [facility]  [address]  [place] [address]  Phone: [phone]          Patient: Logan Massey   MR Number: [id number]   YOB: 1950   Date of Visit: 11/12/2019       Dear Dr. Gerald Montesinos:    Thank you for referring Logan Massey to me for evaluation. Attached you will find relevant portions of my assessment and plan of care.    If you have questions, please do not hesitate to call me. I look forward to following Logan Massey along with you.    Sincerely,    Brandon Lazaro PA-C    Enclosure  CC:  No Recipients    If you would like to receive this communication electronically, please contact [email or web address] or [phone] to request more information on [employer] Link access.    For providers and/or their staff who would like to refer a patient to [facility], please contact us through our one-stop-shop provider referral line, [facility] , at [phone].    If you feel you have received this communication in error or would no longer like to receive these types of communications, please e-mail [email or web address]

## 2019-11-12 NOTE — PROGRESS NOTES
Subjective:      Patient ID: Logan Massey is a 69 y.o. male.    Chief Complaint:Follow-up      History of Present Illness  Mr. Logan Massey is a 69 y.o. male with a past medical history of left knee arthritis, G6PD deficiency, and prostate cancer who presented to Ortho clinic on 10/24/19 with swelling and pain of the left knee. Reports history of knee pain, swelling and aspirations over previous two months. Knee was aspirated in clinic showed-7,420 WBCs 96% segs.  Cultures from this aspiration grew Oxacillin sensitive Staph Lugdunensis. He denied any fevers, recent illnesses, or recent procedures/dental work. TTE negative for vegetations. He went to the OR on 10/26/19 for I&D of left knee. He was discharged home on 10/30/19 with plan for 4-6 weeks IV Cefazolin. He returns to clinic today for follow up. Reports he is doing well. Pain is tolerable, states swelling to knee is about baseline. Only complaint is cough he has had since discharge. Denies fever, chills.     Component      Latest Ref Rng & Units 11/11/2019 11/4/2019 10/26/2019   CRP      <=0.90 mg/L 43.20 (H) 69.50 (H) 176.8 (H)       Review of Systems   Constitution: Positive for malaise/fatigue. Negative for chills, decreased appetite and fever.   HENT: Negative for congestion, ear pain, hearing loss, hoarse voice, sore throat and tinnitus.    Eyes: Negative for blurred vision, redness and visual disturbance.   Cardiovascular: Negative for chest pain, leg swelling and palpitations.   Respiratory: Positive for cough. Negative for hemoptysis, shortness of breath and sputum production.    Hematologic/Lymphatic: Negative for adenopathy. Does not bruise/bleed easily.   Skin: Negative for dry skin, itching, rash and suspicious lesions.   Musculoskeletal: Positive for joint pain. Negative for back pain, myalgias and neck pain.   Gastrointestinal: Positive for constipation. Negative for abdominal pain, diarrhea, heartburn, nausea and vomiting.   Genitourinary:  Positive for frequency. Negative for dysuria, flank pain, hematuria, hesitancy and urgency.   Neurological: Negative for dizziness, headaches, numbness, paresthesias and weakness.   Psychiatric/Behavioral: Negative for depression and memory loss. The patient has insomnia and is nervous/anxious.      Objective:   Physical Exam   Constitutional: He is oriented to person, place, and time. He appears well-developed and well-nourished. No distress.   HENT:   Head: Normocephalic and atraumatic.   Right Ear: External ear normal.   Left Ear: External ear normal.   Nose: Nose normal.   Eyes: Conjunctivae and EOM are normal. Right eye exhibits no discharge. Left eye exhibits no discharge.   Neck: Normal range of motion.   Cardiovascular: Normal rate and normal heart sounds.   Pulmonary/Chest: Effort normal. No respiratory distress. He has decreased breath sounds in the right lower field and the left lower field.   Abdominal: Soft. He exhibits no distension.   Musculoskeletal: He exhibits edema.   Left knee   Neurological: He is alert and oriented to person, place, and time.   Skin: Skin is warm and dry.   PICC line to RUE with dressing intact, no redness/drainage noted   Psychiatric: He has a normal mood and affect. His behavior is normal.   Vitals reviewed.    Assessment:       1. Staphylococcal arthritis of left knee    2. Cough        Patient doing well, states swelling almost to baseline. Slight warmth noted to left knee, no erythema. CRP is trending down, not yet normalized. Discussed with patient that recommend 6 weeks IV antibiotics. Will follow labs outpatient and RTC on 12/6 to assess for EOT. Patient reported has had cough since discharge that is worse at night, occasionally coughs up clear mucous. Mina shortness of breath or difficulty breathing. Breath sound clear, decreased in bases, patient in no distress. Discussed may be post-op atelectasis or URI. Reviewed using IS, coughing, deep breathing and can try  Mucinex to loosen mucous. If symptoms do not improve or worsen directed to call clinic.   Plan:       1. Continue Cefazolin 6g IV continuous infusion x6 weeks total. EOT 12/7/19  2. Weekly labs: CBC, CMP, ESR, CRP per home health  3. Use incentive spirometer, cough and deep breath. Mucinex OTC to help with congestion.   4. RTC in 3 weeks on 12/6/19 to assess for EOT

## 2019-11-12 NOTE — PROGRESS NOTES
S:Logan Massey presents for post-operative evaluation (On call case).   DATE OF PROCEDURE: 10/26/2019      PREOPERATIVE DIAGNOSES:   Left knee septic arthritis     POSTOPERATIVE DIAGNOSES:   Left knee septic arthritis  Left knee osteoarthritis  Left knee degenerative medial and lateral meniscus tears     PROCEDURES PERFORMED:   Left knee arthroscopic extensive debridement  Left knee arthroscopic medial and lateral partial meniscectomies     Surgeon(s) and Role:     * MANDA Roger MD - Primary     * Shane Ha MD - Resident - Assisting     * Sharona Gomez MD - Resident - Assisting     * Gerald Montesinos MD - Resident - Assisting    Logan Massey reports to be doing well 2wk s/p the above mentioned procedure until 4-5 days ago when swelling started to accumulate above his patella and distal quad. This has not affected his mobility and has no trouble ambulating. Denies fevers, chills, night sweats, chest pain, difficulty breathing, calf pain or tenderness. He is doing home health PT 4x/week. Seeing good progress daily. Pain levels are improving, but he is still taking pain medication on days when he has PT. Patient has been following up with Infectious Disease (last seen today) and per patient, ESR and CRP labs are trending downwards. ID plans to see him back on 12/6/19 with PICC in place for 4 more weeks.     O: The incisions are healing well.  No signs of infection.  Sutures were removed. No significant pain or unusual tenderness over distal quadriceps. Significant suprapatellar effusion. Negative straight leg raise, extensor mechanism intact. Compartments are firm but compressible over distal quadriceps.    A/P: Arthroscopic pictures were reviewed with the patient. Plan to follow the rehab plan as previously outlined.   1. Suprapatellar effusion examined under ultrasound with Dr. Stella Escobedo. Confirmed heterogenous hematoma. No significant pockets of fluid identified for aspiration. No aspiration  at this time.  2. Continue home health PT  3. Patient given compression sleeve and prompted to continue wearing IZZY hose as well.  4. Continue icing and elevation.  5. RTC 1 week with Brandon Lazaro PA-C.

## 2019-11-12 NOTE — TELEPHONE ENCOUNTER
----- Message from Jay Farah sent at 11/12/2019 12:55 PM CST -----  Contact: pt  Please call pt at [phone]    Patient has questions about a RX being called into the pharmacy today (RX unknown per pt)    Thank you

## 2019-11-13 ENCOUNTER — TELEPHONE (OUTPATIENT)
Dept: SPORTS MEDICINE | Facility: CLINIC | Age: 69
End: 2019-11-13

## 2019-11-13 NOTE — TELEPHONE ENCOUNTER
----- Message from Brandon Lazaro PA-C sent at 11/13/2019  4:03 PM CST -----  Contact: Patient  Yes continue   ----- Message -----  From: Amelie Fontaine  Sent: 11/13/2019   2:24 PM CST  To: Tejas Taylor Staff    Pt called in regards to needing to continue wearing brace on legs.   Please call to discuss.    [phone]

## 2019-11-19 ENCOUNTER — OFFICE VISIT (OUTPATIENT)
Dept: SPORTS MEDICINE | Facility: CLINIC | Age: 69
End: 2019-11-19
Payer: MEDICARE

## 2019-11-19 ENCOUNTER — LAB VISIT (OUTPATIENT)
Dept: LAB | Facility: HOSPITAL | Age: 69
End: 2019-11-19
Attending: PEDIATRICS
Payer: MEDICARE

## 2019-11-19 ENCOUNTER — PATIENT MESSAGE (OUTPATIENT)
Dept: SPORTS MEDICINE | Facility: CLINIC | Age: 69
End: 2019-11-19

## 2019-11-19 VITALS
HEIGHT: 72 IN | SYSTOLIC BLOOD PRESSURE: 159 MMHG | HEART RATE: 90 BPM | WEIGHT: 176 LBS | BODY MASS INDEX: 23.84 KG/M2 | DIASTOLIC BLOOD PRESSURE: 87 MMHG

## 2019-11-19 DIAGNOSIS — M25.452: Primary | ICD-10-CM

## 2019-11-19 DIAGNOSIS — M00.062 STAPHYLOCOCCAL ARTHRITIS OF LEFT KNEE: ICD-10-CM

## 2019-11-19 DIAGNOSIS — M00.9 PYOGENIC ARTHRITIS OF LEFT KNEE JOINT, DUE TO UNSPECIFIED ORGANISM: ICD-10-CM

## 2019-11-19 DIAGNOSIS — M25.452: ICD-10-CM

## 2019-11-19 LAB — CRP SERPL-MCNC: 55.3 MG/L (ref 0–8.2)

## 2019-11-19 PROCEDURE — 86140 C-REACTIVE PROTEIN: CPT

## 2019-11-19 PROCEDURE — 99024 POSTOP FOLLOW-UP VISIT: CPT | Mod: POP,,, | Performed by: PHYSICIAN ASSISTANT

## 2019-11-19 PROCEDURE — 36415 COLL VENOUS BLD VENIPUNCTURE: CPT

## 2019-11-19 PROCEDURE — 99024 PR POST-OP FOLLOW-UP VISIT: ICD-10-PCS | Mod: POP,,, | Performed by: PHYSICIAN ASSISTANT

## 2019-11-19 PROCEDURE — 99999 PR PBB SHADOW E&M-EST. PATIENT-LVL III: CPT | Mod: PBBFAC,,, | Performed by: PHYSICIAN ASSISTANT

## 2019-11-19 PROCEDURE — 99999 PR PBB SHADOW E&M-EST. PATIENT-LVL III: ICD-10-PCS | Mod: PBBFAC,,, | Performed by: PHYSICIAN ASSISTANT

## 2019-11-19 PROCEDURE — 99213 OFFICE O/P EST LOW 20 MIN: CPT | Mod: PBBFAC | Performed by: PHYSICIAN ASSISTANT

## 2019-11-19 NOTE — PROGRESS NOTES
S:Logan Massey presents for post-operative evaluation (On call case).   DATE OF PROCEDURE: 10/26/2019      PREOPERATIVE DIAGNOSES:   Left knee septic arthritis     POSTOPERATIVE DIAGNOSES:   Left knee septic arthritis  Left knee osteoarthritis  Left knee degenerative medial and lateral meniscus tears     PROCEDURES PERFORMED:   Left knee arthroscopic extensive debridement  Left knee arthroscopic medial and lateral partial meniscectomies     Surgeon(s) and Role:     * MANDA Roger MD - Primary     * Shane Ha MD - Resident - Assisting     * Sharona Gomez MD - Resident - Assisting     * Geradl Montesinos MD - Resident - Assisting    Logan Massey presents 3wk s/p the above mentioned procedure, Previously he reported swelling started to accumulate above his patella and distal quad approximately 10-12 days postop. He has been compliant with icing, elevating and wearing compression. Pain and swelling has not improved.  Mild intermittent pain during the night. Does report low grade fever of 99.6. This has not affected his mobility and has no trouble ambulating. Denies chills, night sweats, chest pain, difficulty breathing, calf pain or tenderness. He is doing home health PT 4x/week. Seeing good progress daily. Pain levels are improving, but he is still taking pain medication on days when he has PT. Patient has been following up with Infectious Disease (last seen today) and per patient, CRP  trending downwards. ESR elevated 8 days ago. ID plans to see him back on 12/6/19 with PICC in place for 3 more weeks for 6 weeks total.     O: The incisions are healing well.  No signs of infection.  Sutures were removed. No significant pain or unusual tenderness over distal quadriceps. Significant suprapatellar fluctuant effusion. Negative straight leg raise, extensor is mechanism intact. Compartments are firm but compressible over distal quadriceps.    A/P: Arthroscopic pictures were reviewed with the patient. Plan  to follow the rehab plan as previously outlined.   1. Suprapatellar effusion examined under ultrasound with Dr. Stella Escobedo on 11/12/2019. Confirmed heterogenous hematoma. No significant pockets of fluid identified for aspiration. No aspiration at that time.  2. Continue home health PT  3. Patient given compression sleeve and prompted to continue wearing IZZY hose as well.  4. Continue icing and elevation.  5. CRP and ESR labs today  6. MRI femur to evaluate fluid collection  7. RTC in 1 week with VETO Roger MD for further management.    All of the patient's questions were answered and the patient will contact us if they have any questions or concerns in the interim.

## 2019-11-21 ENCOUNTER — TELEPHONE (OUTPATIENT)
Dept: INFECTIOUS DISEASES | Facility: CLINIC | Age: 69
End: 2019-11-21

## 2019-11-21 DIAGNOSIS — M00.062 STAPHYLOCOCCAL ARTHRITIS OF LEFT KNEE: Primary | ICD-10-CM

## 2019-11-21 NOTE — TELEPHONE ENCOUNTER
Information was given to Ms. Orellana in our infusion. She will have patient scheduled at 4PM on Monday., She will have this cleared with her department.

## 2019-11-21 NOTE — TELEPHONE ENCOUNTER
Infectious Disease Follow up:    Lab work from 11/19/19 CRP 55.3 and ESR 42. Patient coming on 11/26/19 for MRI, ordered per Ortho, to evaluate suprapatellar effusion. Ortho was unable to aspirate fluid when seen on 11/19/19. Patient denies any increased redness, swelling to knee. PICC line with no redness or drainage. States continues to have non-productive cough that had improved, but noted to be worse over last 24 hours. Will draw labs on Monday and see patient in ID clinic on Tuesday for follow up. Instructed if cough worsens, develops fever or shortness of breath to seek medical attention.

## 2019-11-21 NOTE — TELEPHONE ENCOUNTER
Shyanne High, CNS  You 16 minutes ago (11:35 AM)     Ivana,     I am going to see Mr. Massey in clinic next Tuesday 11/26/19 at 10a. He is coming in town on Monday from [place] 11/25/19 for an MRI around 2:30p. Can you schedule him to have labs drawn in the infusion clinic from his PICC line on Monday either before or after the MRI? Orders are in for labs   Thanks,   Shyanne velazco

## 2019-11-25 ENCOUNTER — HOSPITAL ENCOUNTER (OUTPATIENT)
Dept: RADIOLOGY | Facility: HOSPITAL | Age: 69
Discharge: HOME | End: 2019-11-25
Attending: PHYSICIAN ASSISTANT
Payer: MEDICARE

## 2019-11-25 ENCOUNTER — INFUSION (OUTPATIENT)
Dept: INFECTIOUS DISEASES | Facility: HOSPITAL | Age: 69
End: 2019-11-25
Attending: INTERNAL MEDICINE
Payer: MEDICARE

## 2019-11-25 DIAGNOSIS — M25.452: ICD-10-CM

## 2019-11-25 DIAGNOSIS — M00.062 STAPHYLOCOCCAL ARTHRITIS OF LEFT KNEE: ICD-10-CM

## 2019-11-25 DIAGNOSIS — M00.9 PYOGENIC ARTHRITIS OF LEFT KNEE JOINT, DUE TO UNSPECIFIED ORGANISM: ICD-10-CM

## 2019-11-25 LAB
ALBUMIN SERPL BCP-MCNC: 3.2 G/DL (ref 3.5–5.2)
ALP SERPL-CCNC: 74 U/L (ref 55–135)
ALT SERPL W/O P-5'-P-CCNC: 18 U/L (ref 10–44)
ANION GAP SERPL CALC-SCNC: 7 MMOL/L (ref 8–16)
AST SERPL-CCNC: 33 U/L (ref 10–40)
BASOPHILS # BLD AUTO: 0.05 K/UL (ref 0–0.2)
BASOPHILS NFR BLD: 0.9 % (ref 0–1.9)
BILIRUB SERPL-MCNC: 0.7 MG/DL (ref 0.1–1)
BUN SERPL-MCNC: 14 MG/DL (ref 8–23)
CALCIUM SERPL-MCNC: 9.4 MG/DL (ref 8.7–10.5)
CHLORIDE SERPL-SCNC: 104 MMOL/L (ref 95–110)
CO2 SERPL-SCNC: 28 MMOL/L (ref 23–29)
CREAT SERPL-MCNC: 0.9 MG/DL (ref 0.5–1.4)
CRP SERPL-MCNC: 12 MG/L (ref 0–8.2)
DIFFERENTIAL METHOD: ABNORMAL
EOSINOPHIL # BLD AUTO: 0.4 K/UL (ref 0–0.5)
EOSINOPHIL NFR BLD: 6.5 % (ref 0–8)
ERYTHROCYTE [DISTWIDTH] IN BLOOD BY AUTOMATED COUNT: 13.9 % (ref 11.5–14.5)
ERYTHROCYTE [SEDIMENTATION RATE] IN BLOOD BY WESTERGREN METHOD: 59 MM/HR (ref 0–23)
EST. GFR  (AFRICAN AMERICAN): >60 ML/MIN/1.73 M^2
EST. GFR  (NON AFRICAN AMERICAN): >60 ML/MIN/1.73 M^2
GLUCOSE SERPL-MCNC: 95 MG/DL (ref 70–110)
HCT VFR BLD AUTO: 35.6 % (ref 40–54)
HGB BLD-MCNC: 10.7 G/DL (ref 14–18)
IMM GRANULOCYTES # BLD AUTO: 0.01 K/UL (ref 0–0.04)
IMM GRANULOCYTES NFR BLD AUTO: 0.2 % (ref 0–0.5)
LYMPHOCYTES # BLD AUTO: 1.9 K/UL (ref 1–4.8)
LYMPHOCYTES NFR BLD: 35.7 % (ref 18–48)
MCH RBC QN AUTO: 29.4 PG (ref 27–31)
MCHC RBC AUTO-ENTMCNC: 30.1 G/DL (ref 32–36)
MCV RBC AUTO: 98 FL (ref 82–98)
MONOCYTES # BLD AUTO: 0.4 K/UL (ref 0.3–1)
MONOCYTES NFR BLD: 8.1 % (ref 4–15)
NEUTROPHILS # BLD AUTO: 2.6 K/UL (ref 1.8–7.7)
NEUTROPHILS NFR BLD: 48.6 % (ref 38–73)
NRBC BLD-RTO: 0 /100 WBC
PLATELET # BLD AUTO: 310 K/UL (ref 150–350)
PMV BLD AUTO: 10.2 FL (ref 9.2–12.9)
POTASSIUM SERPL-SCNC: 4.2 MMOL/L (ref 3.5–5.1)
PROT SERPL-MCNC: 7.3 G/DL (ref 6–8.4)
RBC # BLD AUTO: 3.64 M/UL (ref 4.6–6.2)
SODIUM SERPL-SCNC: 139 MMOL/L (ref 136–145)
WBC # BLD AUTO: 5.41 K/UL (ref 3.9–12.7)

## 2019-11-25 PROCEDURE — 73718 MRI FEMUR WITHOUT CONTRAST LEFT: ICD-10-PCS | Mod: 26,LT,, | Performed by: RADIOLOGY

## 2019-11-25 PROCEDURE — 85025 COMPLETE CBC W/AUTO DIFF WBC: CPT

## 2019-11-25 PROCEDURE — 86140 C-REACTIVE PROTEIN: CPT

## 2019-11-25 PROCEDURE — 73718 MRI LOWER EXTREMITY W/O DYE: CPT | Mod: TC,LT

## 2019-11-25 PROCEDURE — 36415 COLL VENOUS BLD VENIPUNCTURE: CPT

## 2019-11-25 PROCEDURE — 73718 MRI LOWER EXTREMITY W/O DYE: CPT | Mod: 26,LT,, | Performed by: RADIOLOGY

## 2019-11-25 PROCEDURE — 80053 COMPREHEN METABOLIC PANEL: CPT

## 2019-11-25 PROCEDURE — 85652 RBC SED RATE AUTOMATED: CPT

## 2019-11-25 NOTE — PROGRESS NOTES
Pt here for labs from [facility] PICC line. Labs collected w/o difficulty. PICC line flushed with NS and Heparin. Pt left in NAD.

## 2019-11-25 NOTE — PROGRESS NOTES
S:Logan Massey presents for post-operative evaluation (On call case).    DATE OF PROCEDURE: 10/26/2019   PROCEDURES PERFORMED:   Left knee arthroscopic extensive debridement  Left knee arthroscopic medial and lateral partial meniscectomies     Logan Massey presents 4.5 wk s/p the above mentioned procedure. Here today to review the MRI of his L Femur. States the knee and leg is feeling better and better.  Remains on antibiotics but set to come off soon.  CRP and ESR remain elevated but trending downward.  No fevers chills or night sweats.  States the knee is feeling better and better.    Previous Hx: Previously he reported swelling started to accumulate above his patella and distal quad approximately 10-12 days postop. He has been compliant with icing, elevating and wearing compression. Pain and swelling has not improved.  Mild intermittent pain during the night. Does report low grade fever of 99.6. This has not affected his mobility and has no trouble ambulating. Denies chills, night sweats, chest pain, difficulty breathing, calf pain or tenderness. He is doing home health PT 4x/week. Seeing good progress daily. Pain levels are improving, but he is still taking pain medication on days when he has PT. Patient has been following up with Infectious Disease (last seen today) and per patient, CRP  trending downwards. ESR elevated 8 days ago. ID plans to see him back on 12/6/19 with PICC in place for 3 more weeks for 6 weeks total.     O: LLE - Palpable swelling in the suprapatellar recess extending into the distal thigh.  Not grossly fluctuant but does appear to have some palpable fluid.  There is also some generalized soft tissue swelling. Full active extension, flexion to 110° with some pain. Overall much improved.  No tenderness at all over the area of swelling. No warmth, erythema, or induration.  The patient has an intact straight leg raise.    MRI L Femur:   1. Large complex knee effusion with prominent synovial  thickening and irregularity, consistent with synovitis. Surrounding muscle edema, while nonspecific, suggestive of inflammatory/infectious etiology to the effusion.  Fluid aspiration could exclude infectious/inflammatory etiology.   2. Severe degenerative changes of the left knee better evaluated on prior MRI.   3. Quadriceps and hamstring tendons are intact.    Labs:  11/19 - ESR 42. CRP 55.3.     A/P:  Findings were discussed with the patient. I tried aspirate the suprapatellar recess today on multiple attempts and the fluid was too thick to really aspirate.  Thicker material was aspirated to some extent in a small volume.  It did not appear infectious.  This also was previously imaged with an ultrasound for attempted aspiration which was unsuccessful.  I have reviewed the MRI.  The patient has no superficial signs of ongoing infection on exam.  No tenderness over this area.  Making progress in physical therapy.  He does have some underlying arthritis which certainly can cause pain. His inflammatory markers are generally trending down.  My recommendation at this time is to continue current care and I have communicated this to the Infectious Disease team.  We can begin to wean him off the antibiotics as well.  Should he develop any recurrent infection related symptoms, we would need to consider repeat surgery. The patient will let me know should he have any significant pain or worsening of symptoms. Return to clinic in 4 weeks.

## 2019-11-26 ENCOUNTER — HOSPITAL ENCOUNTER (OUTPATIENT)
Dept: RADIOLOGY | Facility: HOSPITAL | Age: 69
Discharge: HOME | End: 2019-11-26
Attending: CLINICAL NURSE SPECIALIST
Payer: MEDICARE

## 2019-11-26 ENCOUNTER — OFFICE VISIT (OUTPATIENT)
Dept: INFECTIOUS DISEASES | Facility: CLINIC | Age: 69
End: 2019-11-26
Payer: MEDICARE

## 2019-11-26 ENCOUNTER — OFFICE VISIT (OUTPATIENT)
Dept: SPORTS MEDICINE | Facility: CLINIC | Age: 69
End: 2019-11-26
Payer: MEDICARE

## 2019-11-26 VITALS
HEIGHT: 72 IN | WEIGHT: 174 LBS | BODY MASS INDEX: 23.57 KG/M2 | DIASTOLIC BLOOD PRESSURE: 99 MMHG | SYSTOLIC BLOOD PRESSURE: 174 MMHG | HEART RATE: 96 BPM

## 2019-11-26 VITALS
HEART RATE: 90 BPM | OXYGEN SATURATION: 97 % | HEIGHT: 72 IN | SYSTOLIC BLOOD PRESSURE: 153 MMHG | TEMPERATURE: 99 F | BODY MASS INDEX: 23.59 KG/M2 | WEIGHT: 174.19 LBS | DIASTOLIC BLOOD PRESSURE: 87 MMHG

## 2019-11-26 DIAGNOSIS — Z47.89 SURGICAL AFTERCARE, MUSCULOSKELETAL SYSTEM: Primary | ICD-10-CM

## 2019-11-26 DIAGNOSIS — R05.9 COUGH: ICD-10-CM

## 2019-11-26 DIAGNOSIS — M00.062 STAPHYLOCOCCAL ARTHRITIS OF LEFT KNEE: Primary | ICD-10-CM

## 2019-11-26 DIAGNOSIS — M00.062 STAPHYLOCOCCAL ARTHRITIS OF LEFT KNEE: ICD-10-CM

## 2019-11-26 DIAGNOSIS — M00.9 PYOGENIC ARTHRITIS OF LEFT KNEE JOINT, DUE TO UNSPECIFIED ORGANISM: ICD-10-CM

## 2019-11-26 PROCEDURE — 1159F PR MEDICATION LIST DOCUMENTED IN MEDICAL RECORD: ICD-10-PCS | Mod: ,,, | Performed by: CLINICAL NURSE SPECIALIST

## 2019-11-26 PROCEDURE — 71046 XR CHEST PA AND LATERAL: ICD-10-PCS | Mod: 26,,, | Performed by: RADIOLOGY

## 2019-11-26 PROCEDURE — 99999 PR PBB SHADOW E&M-EST. PATIENT-LVL IV: ICD-10-PCS | Mod: PBBFAC,,, | Performed by: CLINICAL NURSE SPECIALIST

## 2019-11-26 PROCEDURE — 99214 PR OFFICE/OUTPT VISIT, EST, LEVL IV, 30-39 MIN: ICD-10-PCS | Mod: S$PBB,,, | Performed by: CLINICAL NURSE SPECIALIST

## 2019-11-26 PROCEDURE — 1126F AMNT PAIN NOTED NONE PRSNT: CPT | Mod: ,,, | Performed by: CLINICAL NURSE SPECIALIST

## 2019-11-26 PROCEDURE — 1159F MED LIST DOCD IN RCRD: CPT | Mod: ,,, | Performed by: CLINICAL NURSE SPECIALIST

## 2019-11-26 PROCEDURE — 1126F PR PAIN SEVERITY QUANTIFIED, NO PAIN PRESENT: ICD-10-PCS | Mod: ,,, | Performed by: CLINICAL NURSE SPECIALIST

## 2019-11-26 PROCEDURE — 71046 X-RAY EXAM CHEST 2 VIEWS: CPT | Mod: 26,,, | Performed by: RADIOLOGY

## 2019-11-26 PROCEDURE — 71046 X-RAY EXAM CHEST 2 VIEWS: CPT | Mod: TC,FY

## 2019-11-26 PROCEDURE — 99999 PR PBB SHADOW E&M-EST. PATIENT-LVL IV: CPT | Mod: PBBFAC,,, | Performed by: CLINICAL NURSE SPECIALIST

## 2019-11-26 PROCEDURE — 99214 OFFICE O/P EST MOD 30 MIN: CPT | Mod: S$PBB,,, | Performed by: CLINICAL NURSE SPECIALIST

## 2019-11-26 PROCEDURE — 99024 POSTOP FOLLOW-UP VISIT: CPT | Mod: POP,,, | Performed by: ORTHOPAEDIC SURGERY

## 2019-11-26 PROCEDURE — 99999 PR PBB SHADOW E&M-EST. PATIENT-LVL III: ICD-10-PCS | Mod: PBBFAC,,, | Performed by: ORTHOPAEDIC SURGERY

## 2019-11-26 PROCEDURE — 99214 OFFICE O/P EST MOD 30 MIN: CPT | Mod: PBBFAC,25 | Performed by: CLINICAL NURSE SPECIALIST

## 2019-11-26 PROCEDURE — 99999 PR PBB SHADOW E&M-EST. PATIENT-LVL III: CPT | Mod: PBBFAC,,, | Performed by: ORTHOPAEDIC SURGERY

## 2019-11-26 PROCEDURE — 99024 PR POST-OP FOLLOW-UP VISIT: ICD-10-PCS | Mod: POP,,, | Performed by: ORTHOPAEDIC SURGERY

## 2019-11-26 PROCEDURE — 99213 OFFICE O/P EST LOW 20 MIN: CPT | Mod: PBBFAC,25,27 | Performed by: ORTHOPAEDIC SURGERY

## 2019-11-26 NOTE — PROGRESS NOTES
Subjective:      Patient ID: Logan Massey is a 69 y.o. male.    Chief Complaint:Follow-up      History of Present Illness  Mr. Logan Massey is a 69 y.o. male with a past medical history of left knee arthritis, G6PD deficiency, and prostate cancer who presented to Ortho clinic on 10/24/19 with swelling and pain of the left knee. Reports history of knee pain, swelling and aspirations over previous two months. Knee was aspirated in clinic showed-7,420 WBCs 96% segs.  Cultures from this aspiration grew Oxacillin sensitive Staph Lugdunensis. He denied any fevers, recent illnesses, or recent procedures/dental work. TTE negative for vegetations. He went to the OR on 10/26/19 for I&D of left knee. He was discharged home on 10/30/19 with plan for 4-6 weeks IV Cefazolin.     He returns to clinic today for follow up. He is scheduled to follow up in Ortho this afternoon to evaluate suprapatellar effusion. He was seen on 11/19/19 and they were unable aspirate fluid. MRI of knee done 11/25/19- .Large complex knee effusion with prominent synovial thickening and irregularity, consistent with synovitis. Surrounding muscle edema, while nonspecific, suggestive of inflammatory/infectious etiology to the effusion.      Reports he is doing well. Pain still present but tolerable. Has continued swelling to knee that has not worsened. Inflammatory markers trending down not yet normalized. Reports has continued cough he has had since discharge. Has been coughing up clear sputum that has decreased. Reports 99.5 max fever at home. Denies chills.       Component      Latest Ref Rng & Units 11/25/2019 11/19/2019 10/26/2019   CRP      0.0 - 8.2 mg/L 12.0 (H) 55.3 (H) 176.8 (H)     Component      Latest Ref Rng & Units 11/25/2019 11/19/2019 10/26/2019   Sed Rate      0 - 23 mm/Hr 59 (H) 42 (H) 98 (H)       Review of Systems   Constitution: Negative for chills, decreased appetite, fever, malaise/fatigue, night sweats, weight gain and weight loss.    HENT: Positive for congestion. Negative for ear pain, hearing loss, hoarse voice, sore throat and tinnitus.    Eyes: Negative for blurred vision, redness and visual disturbance.   Cardiovascular: Negative for chest pain, leg swelling and palpitations.   Respiratory: Positive for cough, sputum production and wheezing. Negative for hemoptysis and shortness of breath.    Hematologic/Lymphatic: Negative for adenopathy. Does not bruise/bleed easily.   Skin: Negative for dry skin, itching, rash and suspicious lesions.   Musculoskeletal: Positive for joint pain. Negative for back pain, myalgias and neck pain.   Gastrointestinal: Positive for constipation. Negative for abdominal pain, diarrhea, heartburn, nausea and vomiting.   Genitourinary: Positive for frequency and urgency. Negative for dysuria, flank pain, hematuria and hesitancy.   Neurological: Negative for dizziness, headaches, numbness, paresthesias and weakness.   Psychiatric/Behavioral: Negative for depression and memory loss. The patient has insomnia and is nervous/anxious.      Objective:   Physical Exam   Constitutional: He is oriented to person, place, and time. He appears well-developed and well-nourished. No distress.   HENT:   Head: Normocephalic and atraumatic.   Right Ear: External ear normal.   Left Ear: External ear normal.   Nose: Nose normal.   Eyes: Conjunctivae and EOM are normal. Right eye exhibits no discharge. Left eye exhibits no discharge.   Neck: Normal range of motion.   Cardiovascular: Normal rate and regular rhythm.   Pulmonary/Chest: Effort normal and breath sounds normal. No respiratory distress.   Abdominal: Soft.   Musculoskeletal: Normal range of motion. He exhibits edema.   Swelling to left knee   Neurological: He is alert and oriented to person, place, and time.   Skin: Skin is warm and dry.   PICC line to [facility] with no s&s infection   Psychiatric: He has a normal mood and affect. His behavior is normal.   Vitals  reviewed.    Assessment:       1. Staphylococcal arthritis of left knee    2. Cough        Lungs sound clear to ausculation. Due to complaint of cough for several weeks sent for Chest X-Ray that was normal. Called patient to give him results and told him to continue to use OTC relieve and I.S. and follow up with PCP if worsens. Inflammatory markers trending down. Due to end antibiotics next Friday. Will reach out to Ortho to discuss EOT.   Plan:       1. Continue Cefazolin 6g IV continuous infusion x6 weeks total. EOT 12/7/19  2. Weekly labs: CBC, CMP, ESR, CRP per home health  3. Chest X-ray today   4. RTC in 2 weeks on 12/6/19 to assess for EOT

## 2019-11-27 LAB
FUNGUS SPEC CULT: NORMAL
FUNGUS SPEC CULT: NORMAL

## 2019-12-03 ENCOUNTER — TELEPHONE (OUTPATIENT)
Dept: INFECTIOUS DISEASES | Facility: CLINIC | Age: 69
End: 2019-12-03

## 2019-12-03 DIAGNOSIS — M00.062 STAPHYLOCOCCAL ARTHRITIS OF LEFT KNEE: Primary | ICD-10-CM

## 2019-12-04 ENCOUNTER — TELEPHONE (OUTPATIENT)
Dept: SPORTS MEDICINE | Facility: CLINIC | Age: 69
End: 2019-12-04

## 2019-12-04 ENCOUNTER — TELEPHONE (OUTPATIENT)
Dept: INFECTIOUS DISEASES | Facility: CLINIC | Age: 69
End: 2019-12-04

## 2019-12-04 NOTE — TELEPHONE ENCOUNTER
----- Message from Ivana Sanchez MA sent at 11/21/2019 11:54 AM CST -----  You Just now (11:53 AM)          Information was given to Ms. Orellana in our infusion. She will have patient scheduled at 4PM on Monday., She will have this cleared with her department.      Documentation     You 1 minute ago (11:52 AM)          JORDAN Telles  You 16 minutes ago (11:35 AM)       Ivana     I am going to see Mr. Massey in clinic next Tuesday 11/26/19 at 10a. He is coming in town on Monday from [place] 11/25/19 for an MRI around 2:30p. Can you schedule him to have labs drawn in the infusion clinic from his PICC line on Monday either before or after the MRI? Orders are in for labs   Shyanne Avelar   Routing comment             Documentation     JORDAN Telles  You 18 minutes ago (11:35 AM)     Ivana     I am going to see Mr. Massey in clinic next Tuesday 11/26/19 at 10a. He is coming in town on Monday from [place] 11/25/19 for an MRI around 2:30p. Can you schedule him to have labs drawn in the infusion clinic from his PICC line on Monday either before or after the MRI? Orders are in for labs   Shyanne Avelar comment     JORDAN Telles 18 minutes ago (11:35 AM)          Infectious Disease Follow up:     Lab work from 11/19/19 CRP 55.3 and ESR 42. Patient coming on 11/26/19 for MRI, ordered per Ortho, to evaluate suprapatellar effusion. Ortho was unable to aspirate fluid when seen on 11/19/19. Patient denies any increased redness, swelling to knee. PICC line with no redness or drainage. States continues to have non-productive cough that had improved, but noted to be worse over last 24 hours. Will draw labs on Monday and see patient in ID clinic on Tuesday for follow up. Instructed if cough worsens, develops fever or shortness of breath to seek medical attention.       Documentation     Orders Placed This Encounter      Active       Comprehensive metabolic panel Ordered On: 11/21/2019  CBC auto  differential Ordered On: 11/21/2019  Sedimentation rate Ordered On: 11/21/2019  C-reactive protein Ordered On: 11/21/2019

## 2019-12-04 NOTE — TELEPHONE ENCOUNTER
----- Message from Shae Miles sent at 12/3/2019  6:02 PM CST -----      ----- Message -----  From: MANDA Roger MD  Sent: 12/3/2019   6:00 PM CST  To: JORDAN Telles, Shae Jd    Ok. We will see him that day as well.    Thanks for the heads up.      ----- Message -----  From: JORDAN Telles  Sent: 12/3/2019   5:02 PM CST  To: MD Dr. Kana Samuel,    I just spoke to Mr. Massey. On the labs his home health nurse esdras yesterday his CRP was elevated to 33.6 which is up from last week when it was 12. He stated he felt the knee was a little warmer and more swollen. He reports low grade fever of .     I was already scheduled to see him Friday to assess for end of treatment. He is driving in Thursday. I am going to repeat labs at our lab Thursday for better comparison. Did you or one of your team also want to see him Friday?     Thanks,  Shyanne

## 2019-12-04 NOTE — TELEPHONE ENCOUNTER
----- Message from Clarita Harrison MA sent at 12/4/2019  2:20 PM CST -----  Contact: pT      ----- Message -----  From: Sal Paulson  Sent: 12/4/2019   2:13 PM CST  To: Kana Brady Staff    Pt returning call to Clarita in regards to scheduling appt         Callback [phone] (home)

## 2019-12-05 ENCOUNTER — LAB VISIT (OUTPATIENT)
Dept: LAB | Facility: HOSPITAL | Age: 69
End: 2019-12-05
Payer: MEDICARE

## 2019-12-05 DIAGNOSIS — M00.062 STAPHYLOCOCCAL ARTHRITIS OF LEFT KNEE: ICD-10-CM

## 2019-12-05 LAB
ALBUMIN SERPL BCP-MCNC: 3.2 G/DL (ref 3.5–5.2)
ALP SERPL-CCNC: 73 U/L (ref 55–135)
ALT SERPL W/O P-5'-P-CCNC: 8 U/L (ref 10–44)
ANION GAP SERPL CALC-SCNC: 7 MMOL/L (ref 8–16)
AST SERPL-CCNC: 20 U/L (ref 10–40)
BASOPHILS # BLD AUTO: 0.04 K/UL (ref 0–0.2)
BASOPHILS NFR BLD: 0.9 % (ref 0–1.9)
BILIRUB SERPL-MCNC: 0.8 MG/DL (ref 0.1–1)
BUN SERPL-MCNC: 7 MG/DL (ref 8–23)
CALCIUM SERPL-MCNC: 9.4 MG/DL (ref 8.7–10.5)
CHLORIDE SERPL-SCNC: 103 MMOL/L (ref 95–110)
CO2 SERPL-SCNC: 30 MMOL/L (ref 23–29)
CREAT SERPL-MCNC: 0.9 MG/DL (ref 0.5–1.4)
CRP SERPL-MCNC: 38.3 MG/L (ref 0–8.2)
DIFFERENTIAL METHOD: ABNORMAL
EOSINOPHIL # BLD AUTO: 0.4 K/UL (ref 0–0.5)
EOSINOPHIL NFR BLD: 9 % (ref 0–8)
ERYTHROCYTE [DISTWIDTH] IN BLOOD BY AUTOMATED COUNT: 14.3 % (ref 11.5–14.5)
ERYTHROCYTE [SEDIMENTATION RATE] IN BLOOD BY WESTERGREN METHOD: 29 MM/HR (ref 0–23)
EST. GFR  (AFRICAN AMERICAN): >60 ML/MIN/1.73 M^2
EST. GFR  (NON AFRICAN AMERICAN): >60 ML/MIN/1.73 M^2
GLUCOSE SERPL-MCNC: 108 MG/DL (ref 70–110)
HCT VFR BLD AUTO: 37.4 % (ref 40–54)
HGB BLD-MCNC: 11 G/DL (ref 14–18)
IMM GRANULOCYTES # BLD AUTO: 0 K/UL (ref 0–0.04)
IMM GRANULOCYTES NFR BLD AUTO: 0 % (ref 0–0.5)
LYMPHOCYTES # BLD AUTO: 1.7 K/UL (ref 1–4.8)
LYMPHOCYTES NFR BLD: 36.3 % (ref 18–48)
MCH RBC QN AUTO: 28.9 PG (ref 27–31)
MCHC RBC AUTO-ENTMCNC: 29.4 G/DL (ref 32–36)
MCV RBC AUTO: 98 FL (ref 82–98)
MONOCYTES # BLD AUTO: 0.5 K/UL (ref 0.3–1)
MONOCYTES NFR BLD: 11.9 % (ref 4–15)
NEUTROPHILS # BLD AUTO: 1.9 K/UL (ref 1.8–7.7)
NEUTROPHILS NFR BLD: 41.9 % (ref 38–73)
NRBC BLD-RTO: 0 /100 WBC
PLATELET # BLD AUTO: 257 K/UL (ref 150–350)
PMV BLD AUTO: 10.3 FL (ref 9.2–12.9)
POTASSIUM SERPL-SCNC: 4.2 MMOL/L (ref 3.5–5.1)
PROT SERPL-MCNC: 7.2 G/DL (ref 6–8.4)
RBC # BLD AUTO: 3.8 M/UL (ref 4.6–6.2)
SODIUM SERPL-SCNC: 140 MMOL/L (ref 136–145)
WBC # BLD AUTO: 4.55 K/UL (ref 3.9–12.7)

## 2019-12-05 PROCEDURE — 80053 COMPREHEN METABOLIC PANEL: CPT

## 2019-12-05 PROCEDURE — 85652 RBC SED RATE AUTOMATED: CPT

## 2019-12-05 PROCEDURE — 36415 COLL VENOUS BLD VENIPUNCTURE: CPT

## 2019-12-05 PROCEDURE — 85025 COMPLETE CBC W/AUTO DIFF WBC: CPT

## 2019-12-05 PROCEDURE — 86140 C-REACTIVE PROTEIN: CPT

## 2019-12-06 ENCOUNTER — OFFICE VISIT (OUTPATIENT)
Dept: SPORTS MEDICINE | Facility: CLINIC | Age: 69
End: 2019-12-06
Payer: MEDICARE

## 2019-12-06 ENCOUNTER — OFFICE VISIT (OUTPATIENT)
Dept: INFECTIOUS DISEASES | Facility: CLINIC | Age: 69
End: 2019-12-06
Payer: MEDICARE

## 2019-12-06 VITALS
SYSTOLIC BLOOD PRESSURE: 143 MMHG | BODY MASS INDEX: 23.32 KG/M2 | DIASTOLIC BLOOD PRESSURE: 89 MMHG | HEART RATE: 87 BPM | TEMPERATURE: 98 F | WEIGHT: 172.19 LBS | HEIGHT: 72 IN

## 2019-12-06 VITALS
HEIGHT: 72 IN | DIASTOLIC BLOOD PRESSURE: 96 MMHG | SYSTOLIC BLOOD PRESSURE: 152 MMHG | WEIGHT: 174 LBS | BODY MASS INDEX: 23.57 KG/M2 | HEART RATE: 105 BPM

## 2019-12-06 DIAGNOSIS — Z98.890 S/P LEFT KNEE ARTHROSCOPY: Primary | ICD-10-CM

## 2019-12-06 DIAGNOSIS — M00.062 STAPHYLOCOCCAL ARTHRITIS OF LEFT KNEE: Primary | ICD-10-CM

## 2019-12-06 PROCEDURE — 99214 PR OFFICE/OUTPT VISIT, EST, LEVL IV, 30-39 MIN: ICD-10-PCS | Mod: S$PBB,,, | Performed by: CLINICAL NURSE SPECIALIST

## 2019-12-06 PROCEDURE — 1125F AMNT PAIN NOTED PAIN PRSNT: CPT | Mod: ,,, | Performed by: CLINICAL NURSE SPECIALIST

## 2019-12-06 PROCEDURE — 99999 PR PBB SHADOW E&M-EST. PATIENT-LVL III: ICD-10-PCS | Mod: PBBFAC,,, | Performed by: PHYSICIAN ASSISTANT

## 2019-12-06 PROCEDURE — 99213 OFFICE O/P EST LOW 20 MIN: CPT | Mod: PBBFAC | Performed by: PHYSICIAN ASSISTANT

## 2019-12-06 PROCEDURE — 99024 PR POST-OP FOLLOW-UP VISIT: ICD-10-PCS | Mod: POP,,, | Performed by: PHYSICIAN ASSISTANT

## 2019-12-06 PROCEDURE — 99999 PR PBB SHADOW E&M-EST. PATIENT-LVL III: CPT | Mod: PBBFAC,,, | Performed by: PHYSICIAN ASSISTANT

## 2019-12-06 PROCEDURE — 1159F MED LIST DOCD IN RCRD: CPT | Mod: ,,, | Performed by: CLINICAL NURSE SPECIALIST

## 2019-12-06 PROCEDURE — 99024 POSTOP FOLLOW-UP VISIT: CPT | Mod: POP,,, | Performed by: PHYSICIAN ASSISTANT

## 2019-12-06 PROCEDURE — 1159F PR MEDICATION LIST DOCUMENTED IN MEDICAL RECORD: ICD-10-PCS | Mod: ,,, | Performed by: CLINICAL NURSE SPECIALIST

## 2019-12-06 PROCEDURE — 99999 PR PBB SHADOW E&M-EST. PATIENT-LVL III: CPT | Mod: PBBFAC,,, | Performed by: CLINICAL NURSE SPECIALIST

## 2019-12-06 PROCEDURE — 99214 OFFICE O/P EST MOD 30 MIN: CPT | Mod: S$PBB,,, | Performed by: CLINICAL NURSE SPECIALIST

## 2019-12-06 PROCEDURE — 99213 OFFICE O/P EST LOW 20 MIN: CPT | Mod: PBBFAC,27 | Performed by: CLINICAL NURSE SPECIALIST

## 2019-12-06 PROCEDURE — 1125F PR PAIN SEVERITY QUANTIFIED, PAIN PRESENT: ICD-10-PCS | Mod: ,,, | Performed by: CLINICAL NURSE SPECIALIST

## 2019-12-06 PROCEDURE — 99999 PR PBB SHADOW E&M-EST. PATIENT-LVL III: ICD-10-PCS | Mod: PBBFAC,,, | Performed by: CLINICAL NURSE SPECIALIST

## 2019-12-06 NOTE — PROGRESS NOTES
Subjective:      Patient ID: Logan Massey is a 69 y.o. male.    Chief Complaint:Follow-up      History of Present Illness  Mr. Logan Massey is a 69 y.o. male with a past medical history of left knee arthritis, G6PD deficiency, and prostate cancer who presented to Ortho clinic on 10/24/19 with swelling and pain of the left knee. Reports history of knee pain, swelling and aspirations over previous two months. Knee was aspirated in clinic showed-7,420 WBCs 96% segs.  Cultures from this aspiration grew Oxacillin sensitive Staph Lugdunensis. He denied any fevers, recent illnesses, or recent procedures/dental work. TTE negative for vegetations. He went to the OR on 10/26/19 for I&D of left knee. He was discharged home on 10/30/19 with plan for 4-6 weeks IV Cefazolin.      He returns to clinic today for follow up. He has completed 6 weeks continuous IV Cefazolin. Pain still present but tolerable. Has continued swelling to knee that has not worsened. No erythema or warmth noted. He was also seen in clinic today by Ortho and assessed to have no signs of ongoing infection. He has underling arthritis that can be cause of pain and swelling. He is currently working outpatient with physical therapy.     Inflammatory markers trended down from start of event but never normalized. Reports has continued cough he has had since discharge Has been coughing up clear sputum that has decreased. Chest X-ray taken 11/26/19 with no acute process. PICC line site no s&s infection. Reports 99.5 max fever at home. Denies chills.       Review of Systems   Constitution: Positive for fever, malaise/fatigue and night sweats. Negative for chills, decreased appetite, weight gain and weight loss.   HENT: Positive for congestion. Negative for ear pain, hearing loss, hoarse voice, sore throat and tinnitus.    Eyes: Negative for blurred vision, redness and visual disturbance.   Cardiovascular: Positive for leg swelling. Negative for chest pain and  palpitations.   Respiratory: Positive for cough and sputum production. Negative for hemoptysis and shortness of breath.    Hematologic/Lymphatic: Negative for adenopathy. Does not bruise/bleed easily.   Skin: Negative for dry skin, itching, rash and suspicious lesions.   Musculoskeletal: Positive for joint pain. Negative for back pain, myalgias and neck pain.   Gastrointestinal: Negative for abdominal pain, constipation, diarrhea, heartburn, nausea and vomiting.   Genitourinary: Positive for frequency and urgency. Negative for dysuria, flank pain, hematuria and hesitancy.   Neurological: Positive for weakness. Negative for dizziness, headaches, numbness and paresthesias.   Psychiatric/Behavioral: Positive for memory loss. Negative for depression. The patient has insomnia and is nervous/anxious.      Objective:   Physical Exam   Constitutional: He is oriented to person, place, and time. He appears well-developed and well-nourished. No distress.   HENT:   Head: Normocephalic and atraumatic.   Right Ear: External ear normal.   Left Ear: External ear normal.   Nose: Nose normal.   Eyes: Conjunctivae and EOM are normal. Right eye exhibits no discharge. Left eye exhibits no discharge.   Neck: Normal range of motion.   Cardiovascular: Normal rate.   Pulmonary/Chest: Effort normal. No respiratory distress.   Abdominal: Soft.   Musculoskeletal: Normal range of motion. He exhibits edema.   Left knee   Neurological: He is alert and oriented to person, place, and time.   Skin: Skin is warm and dry.   PICC line to RUE with no s&s infection   Psychiatric: He has a normal mood and affect. His behavior is normal.   Vitals reviewed.    Assessment:       1. Staphylococcal arthritis of left knee        Mr. Massey has completed 6 weeks IV Cefazolin for treatment of Oxacillin sensitive Staph Lugdunensis. While CRP continues to be elevated he has no other clinical indications of infection and received appropriate therapy. Discussed with  him that recommend stopping antibiotics and repeating labs in 2 weeks prior to next Ortho appointment. If he has worsening of symptoms before then he was directed to call clinic. Plan to reassess labs in 2 weeks off antibiotics and discuss with Ortho if further surgical intervention indicated.   Plan:       1. D/C Cefazolin- no further antibiotics indicated at this time  2. D/C PICC line- done in office without difficulty  3. Repeat labs in 2 weeks: CBC, CMP, ESR, CRP- appointment scheduled for 12/18/19  4. RTC is worsening of symptoms or new problems/concerns

## 2019-12-06 NOTE — PROGRESS NOTES
S:Logan Massey presents for post-operative evaluation (On call case).    DATE OF PROCEDURE: 10/26/2019   PROCEDURES PERFORMED:   Left knee arthroscopic extensive debridement  Left knee arthroscopic medial and lateral partial meniscectomies     Logan Massey presents 6 wk s/p the above mentioned procedure. Pain today is 2/10. He has been working with Home health 2x a week.  States the knee and leg is feeling sore and stiff.  Remains on antibiotics but set to come off soon. He has an appt with Infectious Disease today to see about discontinuing antibiotics.   No fevers chills or night sweats.      Previous Hx: Previously he reported swelling started to accumulate above his patella and distal quad approximately 10-12 days postop. He has been compliant with icing, elevating and wearing compression. Pain and swelling has not improved.  Mild intermittent pain during the night. Does report low grade fever of 99.6. This has not affected his mobility and has no trouble ambulating. Denies chills, night sweats, chest pain, difficulty breathing, calf pain or tenderness. He is doing home health PT 4x/week. Seeing good progress daily. Pain levels are improving, but he is still taking pain medication on days when he has PT. Patient has been following up with Infectious Disease (last seen today) and per patient, CRP  trending downwards. ESR elevated 8 days ago. ID plans to see him back on 12/6/19 with PICC in place for 3 more weeks for 6 weeks total.     O: LLE - Palpable swelling in the suprapatellar recess extending into the distal thigh.  Not grossly fluctuant but does appear to have some palpable fluid.  There is also some generalized soft tissue swelling. Full active extension, flexion to 115° with some pain. Overall much improved.  No tenderness at all over the area of swelling. No warmth, erythema, or induration.  The patient has an intact straight leg raise.    MRI L Femur:   1. Large complex knee effusion with prominent  synovial thickening and irregularity, consistent with synovitis. Surrounding muscle edema, while nonspecific, suggestive of inflammatory/infectious etiology to the effusion.  Fluid aspiration could exclude infectious/inflammatory etiology.   2. Severe degenerative changes of the left knee better evaluated on prior MRI.   3. Quadriceps and hamstring tendons are intact.    Labs:  12/5 - ESR 29. CRP 38.3.     A/P:  Findings were discussed with the patient. Dr. Roger tried aspirate the suprapatellar recess 2 weeks ago on multiple attempts and the fluid was too thick to really aspirate.  Thicker material was aspirated to some extent in a small volume.  It did not appear infectious.  This also was previously imaged with an ultrasound for attempted aspiration which was unsuccessful.  Dr. Roger reviewed the MRI.  The patient has no superficial signs of ongoing infection on exam.  No tenderness over this area.  Making progress in physical therapy.  He does have some underlying arthritis which certainly can cause pain.  My recommendation at this time is to continue current care.  Should he develop any recurrent infection related symptoms, we would need to consider repeat surgery. The patient will let me know should he have any significant pain or worsening of symptoms. Return to clinic in 2 weeks with Dr. Roger. Follow up with infectious disease today.

## 2019-12-18 ENCOUNTER — LAB VISIT (OUTPATIENT)
Dept: LAB | Facility: HOSPITAL | Age: 69
End: 2019-12-18
Payer: MEDICARE

## 2019-12-18 DIAGNOSIS — M00.062 STAPHYLOCOCCAL ARTHRITIS OF LEFT KNEE: ICD-10-CM

## 2019-12-18 LAB
ALBUMIN SERPL BCP-MCNC: 3.6 G/DL (ref 3.5–5.2)
ALP SERPL-CCNC: 76 U/L (ref 55–135)
ALT SERPL W/O P-5'-P-CCNC: 15 U/L (ref 10–44)
ANION GAP SERPL CALC-SCNC: 8 MMOL/L (ref 8–16)
AST SERPL-CCNC: 19 U/L (ref 10–40)
BASOPHILS # BLD AUTO: 0.06 K/UL (ref 0–0.2)
BASOPHILS NFR BLD: 1.3 % (ref 0–1.9)
BILIRUB SERPL-MCNC: 0.8 MG/DL (ref 0.1–1)
BUN SERPL-MCNC: 7 MG/DL (ref 8–23)
CALCIUM SERPL-MCNC: 9.6 MG/DL (ref 8.7–10.5)
CHLORIDE SERPL-SCNC: 104 MMOL/L (ref 95–110)
CO2 SERPL-SCNC: 28 MMOL/L (ref 23–29)
CREAT SERPL-MCNC: 0.8 MG/DL (ref 0.5–1.4)
CRP SERPL-MCNC: 5 MG/L (ref 0–8.2)
DIFFERENTIAL METHOD: ABNORMAL
EOSINOPHIL # BLD AUTO: 0.3 K/UL (ref 0–0.5)
EOSINOPHIL NFR BLD: 6.8 % (ref 0–8)
ERYTHROCYTE [DISTWIDTH] IN BLOOD BY AUTOMATED COUNT: 15.1 % (ref 11.5–14.5)
ERYTHROCYTE [SEDIMENTATION RATE] IN BLOOD BY WESTERGREN METHOD: 25 MM/HR (ref 0–23)
EST. GFR  (AFRICAN AMERICAN): >60 ML/MIN/1.73 M^2
EST. GFR  (NON AFRICAN AMERICAN): >60 ML/MIN/1.73 M^2
GLUCOSE SERPL-MCNC: 88 MG/DL (ref 70–110)
HCT VFR BLD AUTO: 41.5 % (ref 40–54)
HGB BLD-MCNC: 12.3 G/DL (ref 14–18)
IMM GRANULOCYTES # BLD AUTO: 0.01 K/UL (ref 0–0.04)
IMM GRANULOCYTES NFR BLD AUTO: 0.2 % (ref 0–0.5)
LYMPHOCYTES # BLD AUTO: 2 K/UL (ref 1–4.8)
LYMPHOCYTES NFR BLD: 42.2 % (ref 18–48)
MCH RBC QN AUTO: 29.6 PG (ref 27–31)
MCHC RBC AUTO-ENTMCNC: 29.6 G/DL (ref 32–36)
MCV RBC AUTO: 100 FL (ref 82–98)
MONOCYTES # BLD AUTO: 0.4 K/UL (ref 0.3–1)
MONOCYTES NFR BLD: 9 % (ref 4–15)
NEUTROPHILS # BLD AUTO: 1.9 K/UL (ref 1.8–7.7)
NEUTROPHILS NFR BLD: 40.5 % (ref 38–73)
NRBC BLD-RTO: 0 /100 WBC
PLATELET # BLD AUTO: 238 K/UL (ref 150–350)
PMV BLD AUTO: 10.2 FL (ref 9.2–12.9)
POTASSIUM SERPL-SCNC: 4.1 MMOL/L (ref 3.5–5.1)
PROT SERPL-MCNC: 7.3 G/DL (ref 6–8.4)
RBC # BLD AUTO: 4.15 M/UL (ref 4.6–6.2)
SODIUM SERPL-SCNC: 140 MMOL/L (ref 136–145)
WBC # BLD AUTO: 4.69 K/UL (ref 3.9–12.7)

## 2019-12-18 PROCEDURE — 85025 COMPLETE CBC W/AUTO DIFF WBC: CPT

## 2019-12-18 PROCEDURE — 85652 RBC SED RATE AUTOMATED: CPT

## 2019-12-18 PROCEDURE — 36415 COLL VENOUS BLD VENIPUNCTURE: CPT

## 2019-12-18 PROCEDURE — 86140 C-REACTIVE PROTEIN: CPT

## 2019-12-18 PROCEDURE — 80053 COMPREHEN METABOLIC PANEL: CPT

## 2019-12-18 NOTE — PROGRESS NOTES
S:Logan Massey presents for post-operative evaluation (On call case).    DATE OF PROCEDURE: 10/26/2019   PROCEDURES PERFORMED:   Left knee arthroscopic extensive debridement  Left knee arthroscopic medial and lateral partial meniscectomies    Logan Massey presents 8 wk s/p the above mentioned procedure. He has been working with Home Health 2 x/Week. Last followed up with ID on 12/6 and they antibiotics. Presents today ambulating with a cane, previously on a walker. States he feels better than LOV. Making progress with the knee.     O: LLE - Boggy knee. No effusion. There is some generalized soft tissue swelling. Full active extension, flexion to 115° with some pain. Overall much improved. No tenderness at all over the area of swelling. No warmth, erythema, or induration. The patient has an intact straight leg raise.    MRI L Femur:   1. Large complex knee effusion with prominent synovial thickening and irregularity, consistent with synovitis. Surrounding muscle edema, while nonspecific, suggestive of inflammatory/infectious etiology to the effusion.  Fluid aspiration could exclude infectious/inflammatory etiology.   2. Severe degenerative changes of the left knee better evaluated on prior MRI.   3. Quadriceps and hamstring tendons are intact.    Labs:  12/18 - ESR 25. CRP 5.0.      A/P: Clinically improving despite being off antibiotics. Near normalized inflammatory markers. No concern for lingering infection at this time. Discussed his underlying OA & treatment options to address this. Would not recommend a TKA until more than 1 year off the antibiotics. Would consider repeat aspiration/labs prior to proceeding. Celebrex sent to the pharmacy to assist with his arthritic pain. Fit for a medial  as well. RTC as needed. All questions answered.

## 2019-12-19 ENCOUNTER — OFFICE VISIT (OUTPATIENT)
Dept: SPORTS MEDICINE | Facility: CLINIC | Age: 69
End: 2019-12-19
Payer: MEDICARE

## 2019-12-19 VITALS
HEART RATE: 100 BPM | DIASTOLIC BLOOD PRESSURE: 98 MMHG | SYSTOLIC BLOOD PRESSURE: 186 MMHG | WEIGHT: 172 LBS | BODY MASS INDEX: 23.3 KG/M2 | HEIGHT: 72 IN

## 2019-12-19 DIAGNOSIS — Z98.890 S/P LEFT KNEE ARTHROSCOPY: Primary | ICD-10-CM

## 2019-12-19 DIAGNOSIS — M17.12 PRIMARY OSTEOARTHRITIS OF LEFT KNEE: ICD-10-CM

## 2019-12-19 PROCEDURE — 99024 POSTOP FOLLOW-UP VISIT: CPT | Mod: POP,,, | Performed by: ORTHOPAEDIC SURGERY

## 2019-12-19 PROCEDURE — 99213 OFFICE O/P EST LOW 20 MIN: CPT | Mod: PBBFAC | Performed by: ORTHOPAEDIC SURGERY

## 2019-12-19 PROCEDURE — 99999 PR PBB SHADOW E&M-EST. PATIENT-LVL III: CPT | Mod: PBBFAC,,, | Performed by: ORTHOPAEDIC SURGERY

## 2019-12-19 PROCEDURE — 99024 PR POST-OP FOLLOW-UP VISIT: ICD-10-PCS | Mod: POP,,, | Performed by: ORTHOPAEDIC SURGERY

## 2019-12-19 PROCEDURE — 99999 PR PBB SHADOW E&M-EST. PATIENT-LVL III: ICD-10-PCS | Mod: PBBFAC,,, | Performed by: ORTHOPAEDIC SURGERY

## 2019-12-19 RX ORDER — CELECOXIB 200 MG/1
200 CAPSULE ORAL DAILY
Qty: 60 CAPSULE | Refills: 0 | Status: SHIPPED | OUTPATIENT
Start: 2019-12-19 | End: 2020-02-17

## 2019-12-29 LAB
ACID FAST MOD KINY STN SPEC: NORMAL
ACID FAST MOD KINY STN SPEC: NORMAL
MYCOBACTERIUM SPEC QL CULT: NORMAL
MYCOBACTERIUM SPEC QL CULT: NORMAL

## 2020-02-06 ENCOUNTER — HOSPITAL ENCOUNTER (OUTPATIENT)
Dept: RADIOLOGY | Facility: HOSPITAL | Age: 70
Discharge: HOME | End: 2020-02-06
Attending: ORTHOPAEDIC SURGERY
Payer: MEDICARE

## 2020-02-06 DIAGNOSIS — M00.9 PYOGENIC ARTHRITIS OF LEFT KNEE JOINT, DUE TO UNSPECIFIED ORGANISM: ICD-10-CM

## 2020-02-06 PROCEDURE — 71046 X-RAY EXAM CHEST 2 VIEWS: CPT | Mod: 26,,, | Performed by: RADIOLOGY

## 2020-02-06 PROCEDURE — 71046 XR CHEST PA AND LATERAL: ICD-10-PCS | Mod: 26,,, | Performed by: RADIOLOGY

## 2020-02-06 PROCEDURE — 71046 X-RAY EXAM CHEST 2 VIEWS: CPT | Mod: TC,FY

## 2021-07-27 ENCOUNTER — HOSPITAL ENCOUNTER (EMERGENCY)
Facility: HOSPITAL | Age: 71
Discharge: HOME | End: 2021-07-27
Attending: EMERGENCY MEDICINE
Payer: COMMERCIAL

## 2021-07-27 VITALS
TEMPERATURE: 99 F | WEIGHT: 168 LBS | OXYGEN SATURATION: 98 % | HEIGHT: 71 IN | BODY MASS INDEX: 23.52 KG/M2 | SYSTOLIC BLOOD PRESSURE: 177 MMHG | HEART RATE: 71 BPM | DIASTOLIC BLOOD PRESSURE: 91 MMHG | RESPIRATION RATE: 18 BRPM

## 2021-07-27 DIAGNOSIS — M25.559 HIP PAIN: ICD-10-CM

## 2021-07-27 DIAGNOSIS — M54.30 ACUTE SCIATICA: Primary | ICD-10-CM

## 2021-07-27 DIAGNOSIS — R52 PAIN: ICD-10-CM

## 2021-07-27 PROCEDURE — 99284 PR EMERGENCY DEPT VISIT,LEVEL IV: ICD-10-PCS | Mod: ,,, | Performed by: EMERGENCY MEDICINE

## 2021-07-27 PROCEDURE — 99284 EMERGENCY DEPT VISIT MOD MDM: CPT | Mod: ,,, | Performed by: EMERGENCY MEDICINE

## 2021-07-27 PROCEDURE — 25000003 PHARM REV CODE 250: Performed by: EMERGENCY MEDICINE

## 2021-07-27 PROCEDURE — 99284 EMERGENCY DEPT VISIT MOD MDM: CPT

## 2021-07-27 RX ORDER — METHOCARBAMOL 500 MG/1
1000 TABLET, FILM COATED ORAL 3 TIMES DAILY
Qty: 30 TABLET | Refills: 0 | Status: SHIPPED | OUTPATIENT
Start: 2021-07-27 | End: 2021-08-01

## 2021-07-27 RX ORDER — METHOCARBAMOL 500 MG/1
500 TABLET, FILM COATED ORAL
Status: COMPLETED | OUTPATIENT
Start: 2021-07-27 | End: 2021-07-27

## 2021-07-27 RX ADMIN — METHOCARBAMOL 500 MG: 500 TABLET ORAL at 02:07

## 2021-07-29 ENCOUNTER — TELEPHONE (OUTPATIENT)
Dept: PAIN MEDICINE | Facility: CLINIC | Age: 71
End: 2021-07-29

## (undated) DEVICE — DRESSING XEROFORM FOIL PK 1X8

## (undated) DEVICE — DRAPE PLASTIC U 60X72

## (undated) DEVICE — Device

## (undated) DEVICE — PAD COLD THERAPY KNEE WRAP ON

## (undated) DEVICE — SYR 30CC LUER LOCK

## (undated) DEVICE — TRAY ARTHROSCOPY 2/CS

## (undated) DEVICE — BANDAGE ACE ELASTIC 6"

## (undated) DEVICE — SOL IRR NACL .9% 3000ML

## (undated) DEVICE — NDL 18GA X1 1/2 REG BEVEL

## (undated) DEVICE — SEE MEDLINE ITEM 146292

## (undated) DEVICE — NDL SPINAL 18GX3.5 SPINOCAN

## (undated) DEVICE — PAD CAST SPECIALIST STRL 6

## (undated) DEVICE — SUT SILK 2-0 PS 18IN BLACK

## (undated) DEVICE — SEE MEDLINE ITEM 146313

## (undated) DEVICE — GAUZE SPONGE 4X4 12PLY

## (undated) DEVICE — TUBE SET INFLOW/OUTFLOW

## (undated) DEVICE — SEE MEDLINE ITEM 157150

## (undated) DEVICE — PROBE ARTHSCP EDGE ENERGY 50

## (undated) DEVICE — DRAPE STERI INSTRUMENT 1018

## (undated) DEVICE — KIT EVACUATOR 3-SPRING 1/8 DRN

## (undated) DEVICE — DRAPE EMERALD 87X114.75X113

## (undated) DEVICE — PAD ABDOMINAL 5X9 STERILE

## (undated) DEVICE — PUMP COLD THERAPY

## (undated) DEVICE — SUT ETHICON 3-0 BLK MONO PS

## (undated) DEVICE — SEE MEDLINE ITEM 157131

## (undated) DEVICE — GOWN SMART IMP BREATHABLE XXLG

## (undated) DEVICE — PAD ABD 8X10 STERILE

## (undated) DEVICE — BLADE SHAVER LANZA 4.2X13CM

## (undated) DEVICE — APPLICATOR CHLORAPREP ORN 26ML